# Patient Record
Sex: FEMALE | Race: BLACK OR AFRICAN AMERICAN | NOT HISPANIC OR LATINO | URBAN - METROPOLITAN AREA
[De-identification: names, ages, dates, MRNs, and addresses within clinical notes are randomized per-mention and may not be internally consistent; named-entity substitution may affect disease eponyms.]

---

## 2019-11-14 ENCOUNTER — EMERGENCY (EMERGENCY)
Facility: HOSPITAL | Age: 44
LOS: 1 days | Discharge: HOME | End: 2019-11-14
Admitting: EMERGENCY MEDICINE
Payer: COMMERCIAL

## 2019-11-14 VITALS
RESPIRATION RATE: 20 BRPM | DIASTOLIC BLOOD PRESSURE: 62 MMHG | TEMPERATURE: 98 F | HEART RATE: 68 BPM | SYSTOLIC BLOOD PRESSURE: 129 MMHG | OXYGEN SATURATION: 100 %

## 2019-11-14 LAB
ALBUMIN SERPL ELPH-MCNC: 4 G/DL — SIGNIFICANT CHANGE UP (ref 3.3–5)
ALP SERPL-CCNC: 45 U/L — SIGNIFICANT CHANGE UP (ref 40–120)
ALT FLD-CCNC: 15 U/L — SIGNIFICANT CHANGE UP (ref 4–33)
ANION GAP SERPL CALC-SCNC: 12 MMO/L — SIGNIFICANT CHANGE UP (ref 7–14)
APPEARANCE UR: CLEAR — SIGNIFICANT CHANGE UP
AST SERPL-CCNC: 13 U/L — SIGNIFICANT CHANGE UP (ref 4–32)
BACTERIA # UR AUTO: NEGATIVE — SIGNIFICANT CHANGE UP
BASOPHILS # BLD AUTO: 0.05 K/UL — SIGNIFICANT CHANGE UP (ref 0–0.2)
BASOPHILS NFR BLD AUTO: 0.2 % — SIGNIFICANT CHANGE UP (ref 0–2)
BILIRUB SERPL-MCNC: < 0.2 MG/DL — LOW (ref 0.2–1.2)
BILIRUB UR-MCNC: NEGATIVE — SIGNIFICANT CHANGE UP
BLOOD UR QL VISUAL: HIGH
BUN SERPL-MCNC: 14 MG/DL — SIGNIFICANT CHANGE UP (ref 7–23)
CALCIUM SERPL-MCNC: 8.9 MG/DL — SIGNIFICANT CHANGE UP (ref 8.4–10.5)
CHLORIDE SERPL-SCNC: 103 MMOL/L — SIGNIFICANT CHANGE UP (ref 98–107)
CO2 SERPL-SCNC: 24 MMOL/L — SIGNIFICANT CHANGE UP (ref 22–31)
COLOR SPEC: YELLOW — SIGNIFICANT CHANGE UP
CREAT SERPL-MCNC: 0.77 MG/DL — SIGNIFICANT CHANGE UP (ref 0.5–1.3)
EOSINOPHIL # BLD AUTO: 0 K/UL — SIGNIFICANT CHANGE UP (ref 0–0.5)
EOSINOPHIL NFR BLD AUTO: 0 % — SIGNIFICANT CHANGE UP (ref 0–6)
GLUCOSE SERPL-MCNC: 146 MG/DL — HIGH (ref 70–99)
GLUCOSE UR-MCNC: >1000 — HIGH
HCT VFR BLD CALC: 35.5 % — SIGNIFICANT CHANGE UP (ref 34.5–45)
HGB BLD-MCNC: 11.6 G/DL — SIGNIFICANT CHANGE UP (ref 11.5–15.5)
HYALINE CASTS # UR AUTO: NEGATIVE — SIGNIFICANT CHANGE UP
IMM GRANULOCYTES NFR BLD AUTO: 0.8 % — SIGNIFICANT CHANGE UP (ref 0–1.5)
KETONES UR-MCNC: SIGNIFICANT CHANGE UP
LEUKOCYTE ESTERASE UR-ACNC: NEGATIVE — SIGNIFICANT CHANGE UP
LYMPHOCYTES # BLD AUTO: 1.72 K/UL — SIGNIFICANT CHANGE UP (ref 1–3.3)
LYMPHOCYTES # BLD AUTO: 8 % — LOW (ref 13–44)
MCHC RBC-ENTMCNC: 31.4 PG — SIGNIFICANT CHANGE UP (ref 27–34)
MCHC RBC-ENTMCNC: 32.7 % — SIGNIFICANT CHANGE UP (ref 32–36)
MCV RBC AUTO: 96.2 FL — SIGNIFICANT CHANGE UP (ref 80–100)
MONOCYTES # BLD AUTO: 1.61 K/UL — HIGH (ref 0–0.9)
MONOCYTES NFR BLD AUTO: 7.5 % — SIGNIFICANT CHANGE UP (ref 2–14)
NEUTROPHILS # BLD AUTO: 17.98 K/UL — HIGH (ref 1.8–7.4)
NEUTROPHILS NFR BLD AUTO: 83.5 % — HIGH (ref 43–77)
NITRITE UR-MCNC: NEGATIVE — SIGNIFICANT CHANGE UP
NRBC # FLD: 0 K/UL — SIGNIFICANT CHANGE UP (ref 0–0)
PH UR: 6 — SIGNIFICANT CHANGE UP (ref 5–8)
PLATELET # BLD AUTO: 259 K/UL — SIGNIFICANT CHANGE UP (ref 150–400)
PMV BLD: 10.1 FL — SIGNIFICANT CHANGE UP (ref 7–13)
POTASSIUM SERPL-MCNC: 4 MMOL/L — SIGNIFICANT CHANGE UP (ref 3.5–5.3)
POTASSIUM SERPL-SCNC: 4 MMOL/L — SIGNIFICANT CHANGE UP (ref 3.5–5.3)
PROT SERPL-MCNC: 7.2 G/DL — SIGNIFICANT CHANGE UP (ref 6–8.3)
PROT UR-MCNC: 20 — SIGNIFICANT CHANGE UP
RBC # BLD: 3.69 M/UL — LOW (ref 3.8–5.2)
RBC # FLD: 13.3 % — SIGNIFICANT CHANGE UP (ref 10.3–14.5)
RBC CASTS # UR COMP ASSIST: >50 — HIGH (ref 0–?)
SODIUM SERPL-SCNC: 139 MMOL/L — SIGNIFICANT CHANGE UP (ref 135–145)
SP GR SPEC: > 1.04 — HIGH (ref 1–1.04)
SQUAMOUS # UR AUTO: SIGNIFICANT CHANGE UP
UROBILINOGEN FLD QL: NORMAL — SIGNIFICANT CHANGE UP
WBC # BLD: 21.54 K/UL — HIGH (ref 3.8–10.5)
WBC # FLD AUTO: 21.54 K/UL — HIGH (ref 3.8–10.5)
WBC UR QL: SIGNIFICANT CHANGE UP (ref 0–?)

## 2019-11-14 PROCEDURE — 99284 EMERGENCY DEPT VISIT MOD MDM: CPT

## 2019-11-14 PROCEDURE — 76830 TRANSVAGINAL US NON-OB: CPT | Mod: 26

## 2019-11-14 NOTE — ED PROVIDER NOTE - OBJECTIVE STATEMENT
44yo F s/p IVF placement presents to the ED c/o abd cramping and 1 episode of vaginal bleeding x today. Pt states she had a check-up on Nov 6th and was advised her HCG quant was negative at the time of the visit. Pt presents c/o severe cramping pain, similar in quality to her menstrual cramp but much more painful that began this morning, worsened around 3pm and is now improved after passing moderate clot "5 minutes ago." Pt endorsing mild cramping presently, had not had any vaginal bleeding prior to arrival and is not actively bleeding. Denies lightheadedness, cp, sob, dizziness, n/v/d, dysuria. IVF specialist based out of [place], OB/GYN in [place].

## 2019-11-14 NOTE — ED PROVIDER NOTE - PATIENT PORTAL LINK FT
You can access the FollowMyHealth Patient Portal offered by [facility] by registering at the following website: [email or web address]. By joining [employer]’s FollowMyHealth portal, you will also be able to view your health information using other applications (apps) compatible with our system.

## 2019-11-14 NOTE — ED PROVIDER NOTE - CARE PLAN
Principal Discharge DX:	Abdominal pain  Assessment and plan of treatment:	Advance activity as tolerated.  Continue all previously prescribed medications as directed unless otherwise instructed.  Follow up with your primary care physician in 48-72 hours- bring copies of your results.  Return to the ER for worsening or persistent symptoms, and/or ANY NEW OR CONCERNING SYMPTOMS. If you have issues obtaining follow up, please call: [phone] to obtain a doctor or specialist who takes your insurance in your area.  You may call [phone] to make an appointment with the internal medicine clinic.

## 2019-11-14 NOTE — ED ADULT TRIAGE NOTE - CHIEF COMPLAINT QUOTE
states" I am having severe abdominal cramping started since 3 pm and I have a failed invitro and I am waiting for period to come " c/o severe pain.  h/o fibroids

## 2019-11-14 NOTE — ED PROVIDER NOTE - NSFOLLOWUPINSTRUCTIONS_ED_ALL_ED_FT
Advance activity as tolerated.  Continue all previously prescribed medications as directed unless otherwise instructed.  Follow up with your primary care physician in 48-72 hours- bring copies of your results.  Return to the ER for worsening or persistent symptoms, and/or ANY NEW OR CONCERNING SYMPTOMS. If you have issues obtaining follow up, please call: [phone] to obtain a doctor or specialist who takes your insurance in your area.  You may call [phone] to make an appointment with the internal medicine clinic.

## 2019-11-14 NOTE — ED PROVIDER NOTE - PROGRESS NOTE DETAILS
Labs/imaging reviewed, pt w/ no evidence of IUP, no adnexal masses/no evidence of torsion. Pt w/ glucosuria; however, no anion gap or electrolyte abnormality. Pt is under care of PMD in [place], advised to f/u ASAP as she may have DM. Pt stable to be discharged from ED.

## 2019-11-14 NOTE — ED PROVIDER NOTE - PLAN OF CARE
Advance activity as tolerated.  Continue all previously prescribed medications as directed unless otherwise instructed.  Follow up with your primary care physician in 48-72 hours- bring copies of your results.  Return to the ER for worsening or persistent symptoms, and/or ANY NEW OR CONCERNING SYMPTOMS. If you have issues obtaining follow up, please call: [phone] to obtain a doctor or specialist who takes your insurance in your area.  You may call [phone] to make an appointment with the internal medicine clinic.

## 2019-11-16 LAB
BACTERIA UR CULT: SIGNIFICANT CHANGE UP
SPECIMEN SOURCE: SIGNIFICANT CHANGE UP

## 2021-03-27 ENCOUNTER — APPOINTMENT (EMERGENCY)
Dept: CT IMAGING | Facility: HOSPITAL | Age: 46
End: 2021-03-27
Payer: COMMERCIAL

## 2021-03-27 ENCOUNTER — HOSPITAL ENCOUNTER (EMERGENCY)
Facility: HOSPITAL | Age: 46
Discharge: HOME | End: 2021-03-27
Attending: EMERGENCY MEDICINE
Payer: COMMERCIAL

## 2021-03-27 VITALS
OXYGEN SATURATION: 100 % | BODY MASS INDEX: 25.61 KG/M2 | TEMPERATURE: 98.7 F | RESPIRATION RATE: 16 BRPM | HEART RATE: 69 BPM | DIASTOLIC BLOOD PRESSURE: 54 MMHG | WEIGHT: 150 LBS | HEIGHT: 64 IN | SYSTOLIC BLOOD PRESSURE: 105 MMHG

## 2021-03-27 DIAGNOSIS — R51.9 HEADACHE: Primary | ICD-10-CM

## 2021-03-27 LAB
ANION GAP SERPL CALCULATED.3IONS-SCNC: 7 MMOL/L (ref 4–13)
BASOPHILS # BLD AUTO: 0.03 THOUSANDS/ΜL (ref 0–0.1)
BASOPHILS NFR BLD AUTO: 0 % (ref 0–1)
BUN SERPL-MCNC: 15 MG/DL (ref 5–25)
CALCIUM SERPL-MCNC: 8.8 MG/DL (ref 8.3–10.1)
CHLORIDE SERPL-SCNC: 105 MMOL/L (ref 100–108)
CO2 SERPL-SCNC: 28 MMOL/L (ref 21–32)
CREAT SERPL-MCNC: 0.75 MG/DL (ref 0.6–1.3)
EOSINOPHIL # BLD AUTO: 0.05 THOUSAND/ΜL (ref 0–0.61)
EOSINOPHIL NFR BLD AUTO: 1 % (ref 0–6)
ERYTHROCYTE [DISTWIDTH] IN BLOOD BY AUTOMATED COUNT: 13.1 % (ref 11.6–15.1)
EXT PREG TEST URINE: NEGATIVE
EXT. CONTROL ED NAV: NORMAL
FLUAV RNA RESP QL NAA+PROBE: NEGATIVE
FLUBV RNA RESP QL NAA+PROBE: NEGATIVE
GFR SERPL CREATININE-BSD FRML MDRD: 113 ML/MIN/1.73SQ M
GLUCOSE SERPL-MCNC: 85 MG/DL (ref 65–140)
HCT VFR BLD AUTO: 35.4 % (ref 34.8–46.1)
HGB BLD-MCNC: 11.4 G/DL (ref 11.5–15.4)
IMM GRANULOCYTES # BLD AUTO: 0.03 THOUSAND/UL (ref 0–0.2)
IMM GRANULOCYTES NFR BLD AUTO: 0 % (ref 0–2)
LYMPHOCYTES # BLD AUTO: 1.86 THOUSANDS/ΜL (ref 0.6–4.47)
LYMPHOCYTES NFR BLD AUTO: 20 % (ref 14–44)
MCH RBC QN AUTO: 30.6 PG (ref 26.8–34.3)
MCHC RBC AUTO-ENTMCNC: 32.2 G/DL (ref 31.4–37.4)
MCV RBC AUTO: 95 FL (ref 82–98)
MONOCYTES # BLD AUTO: 0.76 THOUSAND/ΜL (ref 0.17–1.22)
MONOCYTES NFR BLD AUTO: 8 % (ref 4–12)
NEUTROPHILS # BLD AUTO: 6.43 THOUSANDS/ΜL (ref 1.85–7.62)
NEUTS SEG NFR BLD AUTO: 71 % (ref 43–75)
NRBC BLD AUTO-RTO: 0 /100 WBCS
PLATELET # BLD AUTO: 210 THOUSANDS/UL (ref 149–390)
PMV BLD AUTO: 10.5 FL (ref 8.9–12.7)
POTASSIUM SERPL-SCNC: 3.9 MMOL/L (ref 3.5–5.3)
RBC # BLD AUTO: 3.72 MILLION/UL (ref 3.81–5.12)
RSV RNA RESP QL NAA+PROBE: NEGATIVE
SARS-COV-2 RNA RESP QL NAA+PROBE: NEGATIVE
SODIUM SERPL-SCNC: 140 MMOL/L (ref 136–145)
WBC # BLD AUTO: 9.16 THOUSAND/UL (ref 4.31–10.16)

## 2021-03-27 PROCEDURE — 85025 COMPLETE CBC W/AUTO DIFF WBC: CPT | Performed by: PHYSICIAN ASSISTANT

## 2021-03-27 PROCEDURE — 0241U HB NFCT DS VIR RESP RNA 4 TRGT: CPT | Performed by: PHYSICIAN ASSISTANT

## 2021-03-27 PROCEDURE — 99284 EMERGENCY DEPT VISIT MOD MDM: CPT

## 2021-03-27 PROCEDURE — 81025 URINE PREGNANCY TEST: CPT | Performed by: PHYSICIAN ASSISTANT

## 2021-03-27 PROCEDURE — 96375 TX/PRO/DX INJ NEW DRUG ADDON: CPT

## 2021-03-27 PROCEDURE — 70450 CT HEAD/BRAIN W/O DYE: CPT

## 2021-03-27 PROCEDURE — 36415 COLL VENOUS BLD VENIPUNCTURE: CPT | Performed by: PHYSICIAN ASSISTANT

## 2021-03-27 PROCEDURE — 80048 BASIC METABOLIC PNL TOTAL CA: CPT | Performed by: PHYSICIAN ASSISTANT

## 2021-03-27 PROCEDURE — 99284 EMERGENCY DEPT VISIT MOD MDM: CPT | Performed by: PHYSICIAN ASSISTANT

## 2021-03-27 PROCEDURE — 96374 THER/PROPH/DIAG INJ IV PUSH: CPT

## 2021-03-27 RX ORDER — IBUPROFEN 400 MG/1
400 TABLET ORAL EVERY 6 HOURS PRN
Qty: 12 TABLET | Refills: 0 | Status: SHIPPED | OUTPATIENT
Start: 2021-03-27 | End: 2021-08-06

## 2021-03-27 RX ORDER — METOCLOPRAMIDE 5 MG/1
5 TABLET ORAL EVERY 6 HOURS PRN
Qty: 12 TABLET | Refills: 0 | Status: SHIPPED | OUTPATIENT
Start: 2021-03-27

## 2021-03-27 RX ORDER — DIPHENHYDRAMINE HCL 25 MG
25 TABLET ORAL EVERY 6 HOURS PRN
Qty: 12 TABLET | Refills: 0 | Status: SHIPPED | OUTPATIENT
Start: 2021-03-27

## 2021-03-27 RX ORDER — METOCLOPRAMIDE HYDROCHLORIDE 5 MG/ML
10 INJECTION INTRAMUSCULAR; INTRAVENOUS ONCE
Status: COMPLETED | OUTPATIENT
Start: 2021-03-27 | End: 2021-03-27

## 2021-03-27 RX ORDER — KETOROLAC TROMETHAMINE 30 MG/ML
30 INJECTION, SOLUTION INTRAMUSCULAR; INTRAVENOUS ONCE
Status: COMPLETED | OUTPATIENT
Start: 2021-03-27 | End: 2021-03-27

## 2021-03-27 RX ORDER — DIPHENHYDRAMINE HYDROCHLORIDE 50 MG/ML
25 INJECTION INTRAMUSCULAR; INTRAVENOUS ONCE
Status: COMPLETED | OUTPATIENT
Start: 2021-03-27 | End: 2021-03-27

## 2021-03-27 RX ADMIN — DIPHENHYDRAMINE HYDROCHLORIDE 25 MG: 50 INJECTION, SOLUTION INTRAMUSCULAR; INTRAVENOUS at 13:14

## 2021-03-27 RX ADMIN — METOCLOPRAMIDE HYDROCHLORIDE 10 MG: 5 INJECTION INTRAMUSCULAR; INTRAVENOUS at 13:17

## 2021-03-27 RX ADMIN — KETOROLAC TROMETHAMINE 30 MG: 30 INJECTION, SOLUTION INTRAMUSCULAR at 13:12

## 2021-03-27 NOTE — Clinical Note
Reginald Delgado was seen and treated in our emergency department on 3/27/2021  Diagnosis:     Xochitl Hancock  may return to work on return date  She may return on this date: 03/29/2021         If you have any questions or concerns, please don't hesitate to call        Lady Hall PA-C    ______________________________           _______________          _______________  Hospital Representative                              Date                                Time

## 2021-03-27 NOTE — ED PROVIDER NOTES
History  Chief Complaint   Patient presents with    Migraine     Pt reports for the last 4 days shes had a migraine, neck pain, N/V       39 y o  female presents to the ED with chief complaint of headache  Onset of symptoms reported as 4 days ago  Location of symptoms reported as the head  Quality is reported as throbbing pain  Severity is reported as moderate  Associated symptoms:  Positive for nausea and vomiting  Denies fevers, positive for neck pain, denies rash, denies blurred vision or loss of vision  Denies unilateral extremity paralysis, paraesthesias or weakness  Denies facial droop or slurred speech  Denies chest pain, denies syncope  Modifiers:  Patient reports she took excedrin at home with partial relief of symptoms  ContexT:  Denies recent fall, injury or trauma to the area  Patient denies that headache was maximal intensity at onset and denies that his headache is worst headache of life but states that she does not have a history of migraine headaches in the past   Positive for some sinus congestion recently as well  Denies fevers   Medical summary: Review of past visit history via EPIC demonstrates no prior visits to this ed  History provided by:  Patient   used: No    Migraine  Associated symptoms: headaches, nausea and vomiting    Associated symptoms: no abdominal pain, no chest pain, no congestion, no cough, no diarrhea, no ear pain, no fatigue, no fever, no myalgias, no rash, no rhinorrhea, no shortness of breath, no sore throat and no wheezing        None       History reviewed  No pertinent past medical history  History reviewed  No pertinent surgical history  History reviewed  No pertinent family history  I have reviewed and agree with the history as documented      E-Cigarette/Vaping    E-Cigarette Use Never User      E-Cigarette/Vaping Substances     Social History     Tobacco Use    Smoking status: Never Smoker    Smokeless tobacco: Never Used Substance Use Topics    Alcohol use: Never     Frequency: Never    Drug use: Never       Review of Systems   Constitutional: Negative for activity change, appetite change, chills, diaphoresis, fatigue, fever and unexpected weight change  HENT: Negative for congestion, dental problem, drooling, ear discharge, ear pain, facial swelling, hearing loss, mouth sores, nosebleeds, postnasal drip, rhinorrhea, sinus pressure, sinus pain, sneezing, sore throat, tinnitus, trouble swallowing and voice change  Eyes: Negative for photophobia, pain, discharge, redness, itching and visual disturbance  Respiratory: Negative for apnea, cough, choking, chest tightness, shortness of breath, wheezing and stridor  Cardiovascular: Negative for chest pain, palpitations and leg swelling  Gastrointestinal: Positive for nausea and vomiting  Negative for abdominal distention, abdominal pain, anal bleeding, blood in stool, constipation, diarrhea and rectal pain  Endocrine: Negative for cold intolerance, heat intolerance, polydipsia, polyphagia and polyuria  Genitourinary: Negative for decreased urine volume, difficulty urinating, dyspareunia, dysuria, enuresis, flank pain, frequency, hematuria, menstrual problem, pelvic pain, urgency, vaginal bleeding, vaginal discharge and vaginal pain  Musculoskeletal: Positive for neck pain  Negative for arthralgias, back pain, gait problem, joint swelling, myalgias and neck stiffness  Skin: Negative for color change, pallor, rash and wound  Allergic/Immunologic: Negative for environmental allergies, food allergies and immunocompromised state  Neurological: Positive for headaches  Negative for dizziness, tremors, seizures, syncope, facial asymmetry, speech difficulty, weakness, light-headedness and numbness  Hematological: Negative for adenopathy  Does not bruise/bleed easily     Psychiatric/Behavioral: Negative for agitation, confusion, hallucinations, self-injury and suicidal ideas  The patient is not hyperactive  All other systems reviewed and are negative  Physical Exam  Physical Exam  Vitals signs and nursing note reviewed  Constitutional:       General: She is not in acute distress  Appearance: Normal appearance  Comments: /91   Pulse 78   Temp 98 7 °F (37 1 °C) (Oral)   Resp 18   Ht 5' 4" (1 626 m)   Wt 68 kg (150 lb)   SpO2 100%   BMI 25 75 kg/m²      HENT:      Head: Normocephalic and atraumatic  Right Ear: Tympanic membrane and external ear normal       Left Ear: Tympanic membrane and external ear normal       Nose: Nose normal  No rhinorrhea  Eyes:      General: No scleral icterus  Right eye: No discharge  Left eye: No discharge  Extraocular Movements: Extraocular movements intact  Conjunctiva/sclera: Conjunctivae normal       Pupils: Pupils are equal, round, and reactive to light  Neck:      Musculoskeletal: Normal range of motion and neck supple  No neck rigidity or muscular tenderness  Cardiovascular:      Rate and Rhythm: Normal rate  Pulses: Normal pulses  Pulmonary:      Effort: Pulmonary effort is normal  No respiratory distress  Musculoskeletal: Normal range of motion  General: No deformity or signs of injury  Lymphadenopathy:      Cervical: No cervical adenopathy  Skin:     Capillary Refill: Capillary refill takes less than 2 seconds  Coloration: Skin is not jaundiced  Findings: No erythema or rash  Neurological:      General: No focal deficit present  Mental Status: She is alert and oriented to person, place, and time  Mental status is at baseline  Cranial Nerves: No cranial nerve deficit  Sensory: No sensory deficit  Motor: No weakness        Coordination: Coordination normal       Gait: Gait normal    Psychiatric:         Mood and Affect: Mood normal          Behavior: Behavior normal          Vital Signs  ED Triage Vitals   Temperature Pulse Respirations Blood Pressure SpO2   03/27/21 1146 03/27/21 1146 03/27/21 1146 03/27/21 1146 03/27/21 1146   98 7 °F (37 1 °C) 72 18 134/64 99 %      Temp Source Heart Rate Source Patient Position - Orthostatic VS BP Location FiO2 (%)   03/27/21 1146 03/27/21 1146 03/27/21 1146 03/27/21 1146 --   Oral Monitor Sitting Left arm       Pain Score       03/27/21 1312       8           Vitals:    03/27/21 1146 03/27/21 1220 03/27/21 1315   BP: 134/64 115/91 128/64   Pulse: 72 78 63   Patient Position - Orthostatic VS: Sitting Lying Lying         Visual Acuity  Visual Acuity      Most Recent Value   L Pupil Size (mm)  3   R Pupil Size (mm)  3          ED Medications  Medications   ketorolac (TORADOL) injection 30 mg (30 mg Intravenous Given 3/27/21 1312)   metoclopramide (REGLAN) injection 10 mg (10 mg Intravenous Given 3/27/21 1317)   diphenhydrAMINE (BENADRYL) injection 25 mg (25 mg Intravenous Given 3/27/21 1314)       Diagnostic Studies  Results Reviewed     Procedure Component Value Units Date/Time    COVID19, Influenza A/B, RSV PCR, [facility] [[id number]]  (Normal) Collected: 03/27/21 1237    Lab Status: Final result Specimen: Nares from Nose Updated: 03/27/21 1322     SARS-CoV-2 Negative     INFLUENZA A PCR Negative     INFLUENZA B PCR Negative     RSV PCR Negative    Narrative: This test has been authorized by FDA under an EUA (Emergency Use Assay) for use by authorized laboratories  Clinical caution and judgement should be used with the interpretation of these results with consideration of the clinical impression and other laboratory testing  Testing reported as "Positive" or "Negative" has been proven to be accurate according to standard laboratory validation requirements  All testing is performed with control materials showing appropriate reactivity at standard intervals      Basic metabolic panel [[id number]] Collected: 03/27/21 1259    Lab Status: Final result Specimen: Blood from Arm, Right Updated: 03/27/21 1320     Sodium 140 mmol/L      Potassium 3 9 mmol/L      Chloride 105 mmol/L      CO2 28 mmol/L      ANION GAP 7 mmol/L      BUN 15 mg/dL      Creatinine 0 75 mg/dL      Glucose 85 mg/dL      Calcium 8 8 mg/dL      eGFR 113 ml/min/1 73sq m     Narrative:      [place] guidelines for Chronic Kidney Disease (CKD):     Stage 1 with normal or high GFR (GFR > 90 mL/min/1 73 square meters)    Stage 2 Mild CKD (GFR = 60-89 mL/min/1 73 square meters)    Stage 3A Moderate CKD (GFR = 45-59 mL/min/1 73 square meters)    Stage 3B Moderate CKD (GFR = 30-44 mL/min/1 73 square meters)    Stage 4 Severe CKD (GFR = 15-29 mL/min/1 73 square meters)    Stage 5 End Stage CKD (GFR <15 mL/min/1 73 square meters)  Note: GFR calculation is accurate only with a steady state creatinine    CBC and differential [[id number]]  (Abnormal) Collected: 03/27/21 1259    Lab Status: Final result Specimen: Blood from Arm, Right Updated: 03/27/21 1308     WBC 9 16 Thousand/uL      RBC 3 72 Million/uL      Hemoglobin 11 4 g/dL      Hematocrit 35 4 %      MCV 95 fL      MCH 30 6 pg      MCHC 32 2 g/dL      RDW 13 1 %      MPV 10 5 fL      Platelets 460 Thousands/uL      nRBC 0 /100 WBCs      Neutrophils Relative 71 %      Immat GRANS % 0 %      Lymphocytes Relative 20 %      Monocytes Relative 8 %      Eosinophils Relative 1 %      Basophils Relative 0 %      Neutrophils Absolute 6 43 Thousands/µL      Immature Grans Absolute 0 03 Thousand/uL      Lymphocytes Absolute 1 86 Thousands/µL      Monocytes Absolute 0 76 Thousand/µL      Eosinophils Absolute 0 05 Thousand/µL      Basophils Absolute 0 03 Thousands/µL     POCT pregnancy, urine [[id number]]  (Normal) Resulted: 03/27/21 1216    Lab Status: Final result Updated: 03/27/21 1216     EXT PREG TEST UR (Ref: Negative) negative     Control valid                 CT head wo contrast   Final Result by Tim George MD (03/27 1333)      No acute intracranial abnormality  Small sphenoid sinus mucus retention cyst             Workstation performed: [id number]                    Procedures  Pre-Procedural Sedation  Performed by: Candance Breaker, PA-C  Authorized by: Candance Breaker, PA-C                ED Course  ED Course as of Mar 27 1438   Sat Mar 27, 2021   1253 Lab results reviewed  Pregnancy test negative  1427 Re-evaluation - patient feels improved after migraine cocktail given here in ed  Discussed ct scan results including incidental finding of sphenoid sinus cyst  Reviewed lab results  Patient feels improved  Discussed symptoms appear consistent with migraine headache  Discussed treatment plan including use of diphenhydramine/motrin/reglan as needed q 6 hours for headache at home  Discussed follow up with pcp and neurology in 3-5 days for recheck and further evaluation of symptoms  Reviewed reasons to return to ed  Patient verbalized understanding of diagnosis and agreement with discharge plan of care as well as understanding of reasons to return to ed  I have reasonably determine that electronically prescribing a controlled substance would be impractical for the patient to obtain the controlled substance prescribed by electronic prescription or would cause an untimely delay resulting in an adverse impact on the patient's medical condition          Patient was seen during the outbreak of the corona virus epidemic   Resources are limited due to the severity of patient illnesses associated with virus   Testing is also limited at this time   Discussed with patient at the time of this evaluation   Due to the fact that limited resources are available -treatment options are limited                                                    MDM  Number of Diagnoses or Management Options  Headache: new and requires workup  Diagnosis management comments: ddx includes but is not limited to tension headache, migraine headache, cluster headache, sinusitis, SAH, SDH, doubt temporal arteritis due to lack of unilateral temporal location of pain, consider intracranial hemorrhage, doubt meningitis due to lack of fever/nuchal rigidity  Amount and/or Complexity of Data Reviewed  Clinical lab tests: ordered and reviewed  Tests in the radiology section of CPT®: ordered and reviewed  Discussion of test results with the performing providers: yes  Review and summarize past medical records: yes  Independent visualization of images, tracings, or specimens: yes    Patient Progress  Patient progress: stable      Disposition  Final diagnoses:   Headache     Time reflects when diagnosis was documented in both MDM as applicable and the Disposition within this note     Time User Action Codes Description Comment    3/27/2021  2:30 PM Madeline Sacks Add [R51 9] Headache       ED Disposition     ED Disposition Condition Date/Time Comment    Discharge Stable Sat Mar 27, 2021  2:30  E Maddison Vidal discharge to home/self care              Follow-up Information     Follow up With Specialties Details Why Contact Info Additional [address] [facility] Emergency Department Emergency Medicine Go to  If symptoms worsen [address] Emergency Department, [address] [facility] [place], [address], MD Neurology Call in 1 week for further evaluation of headache symptoms [place] [address] [facility], [address] [facility], Nurse Practitioner Call in 2 days for further evaluation of symptoms [address] [place] [address]  [phone]             Patient's Medications   Discharge Prescriptions    DIPHENHYDRAMINE (BENADRYL) 25 MG TABLET    Take 1 tablet (25 mg total) by mouth every 6 (six) hours as needed for itching (headache/take with metoclopramide/initial rx )       Start Date: 3/27/2021 End Date: -- Order Dose: 25 mg       Quantity: 12 tablet    Refills: 0    IBUPROFEN (MOTRIN) 400 MG TABLET    Take 1 tablet (400 mg total) by mouth every 6 (six) hours as needed for moderate pain or headaches for up to 5 days       Start Date: 3/27/2021 End Date: 4/1/2021       Order Dose: 400 mg       Quantity: 12 tablet    Refills: 0    METOCLOPRAMIDE (REGLAN) 5 MG TABLET    Take 1 tablet (5 mg total) by mouth every 6 (six) hours as needed (headache/nausea/vomiting )       Start Date: 3/27/2021 End Date: --       Order Dose: 5 mg       Quantity: 12 tablet    Refills: 0     No discharge procedures on file      PDMP Review     None          ED Provider  Electronically Signed by           Radha Lemus PA-C  03/27/21 8075

## 2021-04-02 NOTE — ED PROVIDER NOTES
History  Chief Complaint   Patient presents with    Migraine     Pt reports for the last 4 days shes had a migraine, neck pain, N/V       39 y o  female presents to the ED with chief complaint of headache  Onset of symptoms reported as 4 days ago  Location of symptoms reported as the head  Quality is reported as throbbing pain  Severity is reported as moderate  Associated symptoms:  Positive for nausea and vomiting  Denies fevers, positive for neck pain, denies rash, denies blurred vision or loss of vision  Denies unilateral extremity paralysis, paraesthesias or weakness  Denies facial droop or slurred speech  Denies chest pain, denies syncope  Modifiers:  Patient reports she took excedrin at home with partial relief of symptoms  ContexT:  Denies recent fall, injury or trauma to the area  Patient denies that headache was maximal intensity at onset and denies that his headache is worst headache of life but states that she does not have a history of migraine headaches in the past   Positive for some sinus congestion recently as well  Denies fevers   Medical summary: Review of past visit history via EPIC demonstrates no prior visits to this ed  History provided by:  Patient   used: No    Migraine  Associated symptoms: headaches, nausea and vomiting    Associated symptoms: no abdominal pain, no chest pain, no congestion, no cough, no diarrhea, no ear pain, no fatigue, no fever, no myalgias, no rash, no rhinorrhea, no shortness of breath, no sore throat and no wheezing          History provided by:  Patient   used: No    Migraine  Associated symptoms: headaches and nausea    Associated symptoms: no abdominal pain, no chest pain, no congestion, no cough, no diarrhea, no ear pain, no fatigue, no fever, no myalgias, no rash, no rhinorrhea, no shortness of breath, no sore throat, no vomiting and no wheezing        None       History reviewed   No pertinent past medical history  History reviewed  No pertinent surgical history  History reviewed  No pertinent family history  I have reviewed and agree with the history as documented  E-Cigarette/Vaping    E-Cigarette Use Never User      E-Cigarette/Vaping Substances     Social History     Tobacco Use    Smoking status: Never Smoker    Smokeless tobacco: Never Used   Substance Use Topics    Alcohol use: Never     Frequency: Never    Drug use: Never       Review of Systems   Constitutional: Negative for activity change, appetite change, chills, diaphoresis, fatigue, fever and unexpected weight change  HENT: Negative for congestion, dental problem, drooling, ear discharge, ear pain, facial swelling, hearing loss, mouth sores, nosebleeds, postnasal drip, rhinorrhea, sinus pressure, sinus pain, sneezing, sore throat, tinnitus, trouble swallowing and voice change  Eyes: Negative for photophobia, pain, discharge, redness, itching and visual disturbance  Respiratory: Negative for apnea, cough, choking, chest tightness, shortness of breath, wheezing and stridor  Cardiovascular: Negative for chest pain, palpitations and leg swelling  Gastrointestinal: Positive for nausea  Negative for abdominal distention, abdominal pain, anal bleeding, blood in stool, constipation, diarrhea, rectal pain and vomiting  Endocrine: Negative for cold intolerance, heat intolerance, polydipsia, polyphagia and polyuria  Genitourinary: Negative for decreased urine volume, difficulty urinating, dyspareunia, dysuria, enuresis, flank pain, frequency, hematuria, menstrual problem, pelvic pain, urgency, vaginal bleeding, vaginal discharge and vaginal pain  Musculoskeletal: Positive for neck pain  Negative for arthralgias, back pain, gait problem, joint swelling, myalgias and neck stiffness  Skin: Negative for color change, pallor, rash and wound     Allergic/Immunologic: Negative for environmental allergies, food allergies and immunocompromised state  Neurological: Positive for headaches  Negative for dizziness, tremors, seizures, syncope, facial asymmetry, speech difficulty, weakness, light-headedness and numbness  Hematological: Negative for adenopathy  Does not bruise/bleed easily  Psychiatric/Behavioral: Negative for agitation, confusion, hallucinations, self-injury and suicidal ideas  The patient is not hyperactive  All other systems reviewed and are negative  Physical Exam  Physical Exam  Vitals signs and nursing note reviewed  Constitutional:       General: She is not in acute distress  Appearance: Normal appearance  Comments: /54 (BP Location: Left arm)   Pulse 69   Temp 98 7 °F (37 1 °C) (Oral)   Resp 16   Ht 5' 4" (1 626 m)   Wt 68 kg (150 lb)   SpO2 100%   BMI 25 75 kg/m²      HENT:      Head: Normocephalic and atraumatic  Right Ear: External ear normal       Left Ear: External ear normal       Nose: Nose normal    Eyes:      General: No scleral icterus  Right eye: No discharge  Left eye: No discharge  Conjunctiva/sclera: Conjunctivae normal    Neck:      Musculoskeletal: Normal range of motion and neck supple  Muscular tenderness present  No neck rigidity  Comments: No posterior midline cervical spinal tenderness to palpation  No bony step-offs or deformities on palpation  There is tenderness to palpation to bilateral cervical paraspinal muscles  Cardiovascular:      Rate and Rhythm: Normal rate  Pulses: Normal pulses  Pulmonary:      Effort: Pulmonary effort is normal  No respiratory distress  Abdominal:      General: Bowel sounds are normal  There is no distension  Tenderness: There is no abdominal tenderness  There is no guarding  Musculoskeletal: Normal range of motion  General: No swelling, tenderness, deformity or signs of injury  Lymphadenopathy:      Cervical: No cervical adenopathy  Skin:     General: Skin is warm  Capillary Refill: Capillary refill takes less than 2 seconds  Coloration: Skin is not jaundiced  Findings: No erythema or rash  Neurological:      General: No focal deficit present  Mental Status: She is alert and oriented to person, place, and time  Mental status is at baseline  Cranial Nerves: No cranial nerve deficit  Motor: No weakness  Psychiatric:         Mood and Affect: Mood normal          Behavior: Behavior normal          Vital Signs  ED Triage Vitals   Temperature Pulse Respirations Blood Pressure SpO2   03/27/21 1146 03/27/21 1146 03/27/21 1146 03/27/21 1146 03/27/21 1146   98 7 °F (37 1 °C) 72 18 134/64 99 %      Temp Source Heart Rate Source Patient Position - Orthostatic VS BP Location FiO2 (%)   03/27/21 1146 03/27/21 1146 03/27/21 1146 03/27/21 1146 --   Oral Monitor Sitting Left arm       Pain Score       03/27/21 1312       8           Vitals:    03/27/21 1146 03/27/21 1220 03/27/21 1315 03/27/21 1400   BP: 134/64 115/91 128/64 105/54   Pulse: 72 78 63 69   Patient Position - Orthostatic VS: Sitting Lying Lying Lying         Visual Acuity  Visual Acuity      Most Recent Value   L Pupil Size (mm)  3   R Pupil Size (mm)  3          ED Medications  Medications   ketorolac (TORADOL) injection 30 mg (30 mg Intravenous Given 3/27/21 1312)   metoclopramide (REGLAN) injection 10 mg (10 mg Intravenous Given 3/27/21 1317)   diphenhydrAMINE (BENADRYL) injection 25 mg (25 mg Intravenous Given 3/27/21 1314)       Diagnostic Studies  Results Reviewed     Procedure Component Value Units Date/Time    COVID19, Influenza A/B, RSV PCR, [facility] [[id number]]  (Normal) Collected: 03/27/21 1237    Lab Status: Final result Specimen: Nares from Nose Updated: 03/27/21 1322     SARS-CoV-2 Negative     INFLUENZA A PCR Negative     INFLUENZA B PCR Negative     RSV PCR Negative    Narrative:        This test has been authorized by FDA under an EUA (Emergency Use Assay) for use by authorized laboratories  Clinical caution and judgement should be used with the interpretation of these results with consideration of the clinical impression and other laboratory testing  Testing reported as "Positive" or "Negative" has been proven to be accurate according to standard laboratory validation requirements  All testing is performed with control materials showing appropriate reactivity at standard intervals      Basic metabolic panel [[id number]] Collected: 03/27/21 1259    Lab Status: Final result Specimen: Blood from Arm, Right Updated: 03/27/21 1320     Sodium 140 mmol/L      Potassium 3 9 mmol/L      Chloride 105 mmol/L      CO2 28 mmol/L      ANION GAP 7 mmol/L      BUN 15 mg/dL      Creatinine 0 75 mg/dL      Glucose 85 mg/dL      Calcium 8 8 mg/dL      eGFR 113 ml/min/1 73sq m     Narrative:      [place] guidelines for Chronic Kidney Disease (CKD):     Stage 1 with normal or high GFR (GFR > 90 mL/min/1 73 square meters)    Stage 2 Mild CKD (GFR = 60-89 mL/min/1 73 square meters)    Stage 3A Moderate CKD (GFR = 45-59 mL/min/1 73 square meters)    Stage 3B Moderate CKD (GFR = 30-44 mL/min/1 73 square meters)    Stage 4 Severe CKD (GFR = 15-29 mL/min/1 73 square meters)    Stage 5 End Stage CKD (GFR <15 mL/min/1 73 square meters)  Note: GFR calculation is accurate only with a steady state creatinine    CBC and differential [[id number]]  (Abnormal) Collected: 03/27/21 1259    Lab Status: Final result Specimen: Blood from Arm, Right Updated: 03/27/21 1308     WBC 9 16 Thousand/uL      RBC 3 72 Million/uL      Hemoglobin 11 4 g/dL      Hematocrit 35 4 %      MCV 95 fL      MCH 30 6 pg      MCHC 32 2 g/dL      RDW 13 1 %      MPV 10 5 fL      Platelets 952 Thousands/uL      nRBC 0 /100 WBCs      Neutrophils Relative 71 %      Immat GRANS % 0 %      Lymphocytes Relative 20 %      Monocytes Relative 8 %      Eosinophils Relative 1 %      Basophils Relative 0 %      Neutrophils Absolute 6 43 Thousands/µL      Immature Grans Absolute 0 03 Thousand/uL      Lymphocytes Absolute 1 86 Thousands/µL      Monocytes Absolute 0 76 Thousand/µL      Eosinophils Absolute 0 05 Thousand/µL      Basophils Absolute 0 03 Thousands/µL     POCT pregnancy, urine [[id number]]  (Normal) Resulted: 03/27/21 1216    Lab Status: Final result Updated: 03/27/21 1216     EXT PREG TEST UR (Ref: Negative) negative     Control valid                 CT head wo contrast   Final Result by Susan Denis MD (03/27 1333)      No acute intracranial abnormality  Small sphenoid sinus mucus retention cyst             Workstation performed: [id number]                    Procedures  Procedures         ED Course  ED Course as of Apr 02 1400   Sat Mar 27, 2021   1253 Lab results reviewed  Pregnancy test negative  1427 Re-evaluation - patient feels improved after migraine cocktail given here in ed  Discussed ct scan results including incidental finding of sphenoid sinus cyst  Reviewed lab results  Patient feels improved  Discussed symptoms appear consistent with migraine headache  Discussed treatment plan including use of diphenhydramine/motrin/reglan as needed q 6 hours for headache at home  Discussed follow up with pcp and neurology in 3-5 days for recheck and further evaluation of symptoms  Reviewed reasons to return to ed  Patient verbalized understanding of diagnosis and agreement with discharge plan of care as well as understanding of reasons to return to ed  I have reasonably determine that electronically prescribing a controlled substance would be impractical for the patient to obtain the controlled substance prescribed by electronic prescription or would cause an untimely delay resulting in an adverse impact on the patient's medical condition          Patient was seen during the outbreak of the corona virus epidemic   Resources are limited due to the severity of patient illnesses associated with virus   Testing is also limited at this time   Discussed with patient at the time of this evaluation   Due to the fact that limited resources are available -treatment options are limited  MDM  Number of Diagnoses or Management Options  Headache: new and requires workup  Diagnosis management comments: ddx includes but is not limited to tension headache, migraine headache, cluster headache, sinusitis, SAH, SDH, doubt temporal arteritis due to lack of unilateral temporal location of pain, doubt intracranial hemorrhage, doubt meningitis due to lack of fever/nuchal rigidity  Lab results reviewed  CBC demonstrates normal white blood cell count of 9 1, hemoglobin 11 4 is mildly low, hematocrit 35 4 normal   Basic metabolic panel demonstrates a BUN of 15 creatinine 0 74 which are normal   COVID test was negative  Pregnancy test was negative  CT scan of the head images independently visualized interpreted by me  Radiology report reviewed:FINDINGS:     PARENCHYMA:  No intracranial mass, mass effect or midline shift  No CT signs of acute infarction   No acute parenchymal hemorrhage  VENTRICLES AND EXTRA-AXIAL SPACES:  Normal for the patient's age  VISUALIZED ORBITS AND PARANASAL SINUSES:  Sphenoid sinus mucous retention cyst      CALVARIUM AND EXTRACRANIAL SOFT TISSUES:  Normal      Incidentally noted sphenoid sinus mucosal retention cyst findings on CT scan were discussed with the patient at bedside    Discussed outpatient follow-up for further evaluation       Amount and/or Complexity of Data Reviewed  Clinical lab tests: ordered and reviewed  Tests in the radiology section of CPT®: ordered and reviewed  Discussion of test results with the performing providers: yes  Obtain history from someone other than the patient: yes (Sig other)  Review and summarize past medical records: yes  Independent visualization of images, tracings, or specimens: yes    Risk of Complications, Morbidity, and/or Mortality  General comments: ED course:  68-year-old female presents the emergency department chief complaint of headache  Four days ago  No fevers  No nuchal rigidity or meningismal symptoms  CT scan shows no acute intracranial hemorrhage  Lab evaluation largely unremarkable  COVID test was negative  Discussed symptoms appear most consistent with migraine headache  Patient received migraine cocktail in the emergency department with good resolution of her symptoms  Patient feels improved  Discussed treatment plan including rest, use of components of migraine cocktail home for treatment of symptoms  Discussed follow-up with primary care physician and Neurology in 3-5 days for further evaluation treatment of headaches  Discussed follow-up with primary care physician or ENT for further evaluation of sphenoid sinus mucous retention cyst   Patient feels improved after treatment is stable for discharge  Reviewed reasons to return to ed  Patient verbalized understanding of diagnosis and agreement with discharge plan of care as well as understanding of reasons to return to ed  I have reasonably determine that electronically prescribing a controlled substance would be impractical for the patient to obtain the controlled substance prescribed by electronic prescription or would cause an untimely delay resulting in an adverse impact on the patient's medical condition        Patient was seen during the outbreak of the corona virus epidemic   Resources are limited due to the severity of patient illnesses associated with virus   Testing is also limited at this time   Discussed with patient at the time of this evaluation   Due to the fact that limited resources are available -treatment options are limited          Patient Progress  Patient progress: improved      Disposition  Final diagnoses:   Headache     Time reflects when diagnosis was documented in both MDM as applicable and the Disposition within this note     Time User Action Codes Description Comment    3/27/2021  2:30 PM Gary Ortega [R51 [id number]] Headache       ED Disposition     ED Disposition Condition Date/Time Comment    Discharge Stable Sat Mar 27, 2021  2:30  E Maddison Vidal discharge to home/self care  Follow-up Information     Follow up With Specialties Details Why Contact Info Additional [address] [facility] Emergency Medicine Go to  If symptoms worsen [address] [facility] [id number] [facility], [address] [place], [place], [address], [place] Neurology Call in 1 week for further evaluation of headache symptoms [place] [id number] [place] [address], [address] [place] [address], Nurse Practitioner Call in 2 days for further evaluation of symptoms [address] [facility] [address]  [phone]             Discharge Medication List as of 3/27/2021  2:33 PM      START taking these medications    Details   diphenhydrAMINE (BENADRYL) 25 mg tablet Take 1 tablet (25 mg total) by mouth every 6 (six) hours as needed for itching (headache/take with metoclopramide/initial rx ), Starting Sat 3/27/2021, Normal      ibuprofen (MOTRIN) 400 mg tablet Take 1 tablet (400 mg total) by mouth every 6 (six) hours as needed for moderate pain or headaches for up to 5 days, Starting Sat 3/27/2021, Until Thu 4/1/2021, Normal      metoclopramide (REGLAN) 5 mg tablet Take 1 tablet (5 mg total) by mouth every 6 (six) hours as needed (headache/nausea/vomiting ), Starting Sat 3/27/2021, Normal           No discharge procedures on file      PDMP Review     None          ED Provider  Electronically Signed by           Maeve Amin PA-C  04/02/21 1400

## 2021-04-08 ENCOUNTER — OFFICE VISIT (OUTPATIENT)
Dept: FAMILY MEDICINE CLINIC | Facility: CLINIC | Age: 46
End: 2021-04-08
Payer: COMMERCIAL

## 2021-04-08 VITALS
SYSTOLIC BLOOD PRESSURE: 108 MMHG | RESPIRATION RATE: 18 BRPM | DIASTOLIC BLOOD PRESSURE: 66 MMHG | HEIGHT: 64 IN | BODY MASS INDEX: 26.98 KG/M2 | HEART RATE: 77 BPM | TEMPERATURE: 98.1 F | WEIGHT: 158 LBS | OXYGEN SATURATION: 99 %

## 2021-04-08 DIAGNOSIS — Z12.31 ENCOUNTER FOR SCREENING MAMMOGRAM FOR BREAST CANCER: ICD-10-CM

## 2021-04-08 DIAGNOSIS — H69.92 EUSTACHIAN TUBE DISORDER, LEFT: ICD-10-CM

## 2021-04-08 DIAGNOSIS — G43.009 MIGRAINE WITHOUT AURA AND WITHOUT STATUS MIGRAINOSUS, NOT INTRACTABLE: Primary | ICD-10-CM

## 2021-04-08 PROCEDURE — 3725F SCREEN DEPRESSION PERFORMED: CPT | Performed by: NURSE PRACTITIONER

## 2021-04-08 PROCEDURE — 99204 OFFICE O/P NEW MOD 45 MIN: CPT | Performed by: NURSE PRACTITIONER

## 2021-04-08 RX ORDER — SUMATRIPTAN 50 MG/1
TABLET, FILM COATED ORAL
Qty: 9 TABLET | Refills: 0 | Status: SHIPPED | OUTPATIENT
Start: 2021-04-08

## 2021-04-08 RX ORDER — FLUTICASONE PROPIONATE 50 MCG
1 SPRAY, SUSPENSION (ML) NASAL DAILY
Qty: 16 G | Refills: 2 | Status: SHIPPED | OUTPATIENT
Start: 2021-04-08

## 2021-04-08 NOTE — ASSESSMENT & PLAN NOTE
To begin Flonase 1 spray every 12 hours  Follow-up if no improvement in 1 week or sooner if symptoms worsen

## 2021-04-08 NOTE — PROGRESS NOTES
Assessment/Plan:    Migraine without aura and without status migrainosus, not intractable  To begin sumatriptan as needed for abortive therapy  Also discussed patient starting a magnesium and B2 supplement  To begin migraine log to help identify triggers  Counseled on methods to prevent migraines by ensuring patient is staying well hydrated, obtaining enough sleep, eating regularly, and avoiding foods containing tyramine or msg  To keep upcoming appointment with the neurologist next week  Eustachian tube disorder, left  To begin Flonase 1 spray every 12 hours  Follow-up if no improvement in 1 week or sooner if symptoms worsen  Diagnoses and all orders for this visit:    Migraine without aura and without status migrainosus, not intractable  -     fluticasone (FLONASE) 50 mcg/act nasal spray; 1 spray into each nostril daily  -     SUMAtriptan (IMITREX) 50 mg tablet; Take 1 tab at onset of migraines, may repeat in 2 hours if needed for 1 dose  Encounter for screening mammogram for breast cancer  -     Mammo screening bilateral w 3d & cad; Future    Eustachian tube disorder, left          Subjective:      Patient ID: Kaylah Sunshine is a 39 y o  female  Jh Valadez presents for an initial visit  She has not been seen by PCP in the last several years since moving to this area  She developed intermittent headaches about 3 weeks ago  Her headache started on 03/14/2021  After about a week of her headaches, Melsisa was seen in the ER  Her headaches were located throughout her entire head and described as a throbbing pain  She was also experiencing nausea, vomiting, and dizziness  She was given Benadryl, ibuprofen, and Reglan on discharge  A CT of her head was negative other than an incidental finding of a sphenoid cyst   Melissa has an upcoming appointment with the neurologist next week    Her headache has resolved but she does continue to have difficulty focusing and blurred vision when looking in the distance  Her last eye exam was in December, she does were glasses  Denies fever, chills, weakness in extremities, paresthesias, or history of migraines  The following portions of the patient's history were reviewed and updated as appropriate:   She   Patient Active Problem List    Diagnosis Date Noted    Migraine without aura and without status migrainosus, not intractable 04/08/2021    Eustachian tube disorder, left 04/08/2021     Current Outpatient Medications   Medication Sig Dispense Refill    diphenhydrAMINE (BENADRYL) 25 mg tablet Take 1 tablet (25 mg total) by mouth every 6 (six) hours as needed for itching (headache/take with metoclopramide/initial rx ) 12 tablet 0    metoclopramide (REGLAN) 5 mg tablet Take 1 tablet (5 mg total) by mouth every 6 (six) hours as needed (headache/nausea/vomiting ) 12 tablet 0    fluticasone (FLONASE) 50 mcg/act nasal spray 1 spray into each nostril daily 16 g 2    ibuprofen (MOTRIN) 400 mg tablet Take 1 tablet (400 mg total) by mouth every 6 (six) hours as needed for moderate pain or headaches for up to 5 days 12 tablet 0    SUMAtriptan (IMITREX) 50 mg tablet Take 1 tab at onset of migraines, may repeat in 2 hours if needed for 1 dose  9 tablet 0     No current facility-administered medications for this visit  She has No Known Allergies       Review of Systems   Constitutional: Negative  HENT: Positive for congestion  Decrease in hearing in left ear   Eyes: Positive for visual disturbance  Respiratory: Negative  Cardiovascular: Negative  Musculoskeletal: Negative  Skin: Negative  Allergic/Immunologic: Positive for environmental allergies  Neurological: Positive for dizziness and headaches  Negative for seizures, speech difficulty, weakness and numbness  Psychiatric/Behavioral: Negative            /66   Pulse 77   Temp 98 1 °F (36 7 °C) (Tympanic)   Resp 18   Ht 5' 4" (1 626 m)   Wt 71 7 kg (158 lb) LMP 04/03/2021 (Exact Date)   SpO2 99%   BMI 27 12 kg/m²     Objective:     Physical Exam  Vitals signs and nursing note reviewed  Constitutional:       General: She is not in acute distress  Appearance: Normal appearance  She is well-developed and normal weight  She is not ill-appearing, toxic-appearing or diaphoretic  HENT:      Head: Normocephalic and atraumatic  Right Ear: Hearing, tympanic membrane, ear canal and external ear normal       Left Ear: Hearing, ear canal and external ear normal       Ears:      Comments: Presence of fluid behind left tm, no erythema      Nose: Nose normal       Right Turbinates: Enlarged and pale  Left Turbinates: Enlarged and pale  Mouth/Throat:      Mouth: Mucous membranes are moist       Pharynx: Oropharynx is clear  Uvula midline  Eyes:      General: Lids are normal       Conjunctiva/sclera: Conjunctivae normal       Pupils: Pupils are equal, round, and reactive to light  Neck:      Musculoskeletal: Normal range of motion and neck supple  Thyroid: No thyromegaly  Cardiovascular:      Rate and Rhythm: Normal rate and regular rhythm  Heart sounds: Normal heart sounds  No murmur  Pulmonary:      Effort: Pulmonary effort is normal  No respiratory distress  Breath sounds: Normal breath sounds  No wheezing or rales  Chest:      Chest wall: No tenderness  Abdominal:      General: Bowel sounds are normal  There is no distension  Palpations: Abdomen is soft  There is no mass  Tenderness: There is no abdominal tenderness  There is no guarding or rebound  Musculoskeletal: Normal range of motion  General: No tenderness or deformity  Lymphadenopathy:      Cervical: No cervical adenopathy  Skin:     General: Skin is warm and dry  Coloration: Skin is not pale  Findings: No erythema or rash  Neurological:      General: No focal deficit present        Mental Status: She is alert and oriented to person, place, and time  Cranial Nerves: No cranial nerve deficit  Sensory: No sensory deficit  Motor: No weakness  Gait: Gait normal    Psychiatric:         Mood and Affect: Mood normal          Behavior: Behavior normal          Thought Content: Thought content normal          Judgment: Judgment normal              BMI Counseling: Body mass index is 27 12 kg/m²  The BMI is above normal  Nutrition recommendations include reducing portion sizes, decreasing overall calorie intake, 3-5 servings of fruits/vegetables daily, reducing fast food intake, consuming healthier snacks, decreasing soda and/or juice intake, moderation in carbohydrate intake and increasing intake of lean protein  Exercise recommendations include exercising 3-5 times per week

## 2021-04-08 NOTE — PATIENT INSTRUCTIONS

## 2021-04-08 NOTE — ASSESSMENT & PLAN NOTE
To begin sumatriptan as needed for abortive therapy  Also discussed patient starting a magnesium and B2 supplement  To begin migraine log to help identify triggers  Counseled on methods to prevent migraines by ensuring patient is staying well hydrated, obtaining enough sleep, eating regularly, and avoiding foods containing tyramine or msg  To keep upcoming appointment with the neurologist next week

## 2021-04-19 ENCOUNTER — APPOINTMENT (OUTPATIENT)
Dept: LAB | Facility: CLINIC | Age: 46
End: 2021-04-19
Payer: COMMERCIAL

## 2021-04-19 ENCOUNTER — CONSULT (OUTPATIENT)
Dept: NEUROLOGY | Facility: CLINIC | Age: 46
End: 2021-04-19
Payer: COMMERCIAL

## 2021-04-19 VITALS
DIASTOLIC BLOOD PRESSURE: 60 MMHG | WEIGHT: 153 LBS | SYSTOLIC BLOOD PRESSURE: 104 MMHG | BODY MASS INDEX: 25.49 KG/M2 | HEIGHT: 65 IN | HEART RATE: 64 BPM

## 2021-04-19 DIAGNOSIS — H53.2 DIPLOPIA: Primary | ICD-10-CM

## 2021-04-19 DIAGNOSIS — G44.52 NEW PERSISTENT DAILY HEADACHE: ICD-10-CM

## 2021-04-19 DIAGNOSIS — H53.2 DIPLOPIA: ICD-10-CM

## 2021-04-19 LAB
ERYTHROCYTE [SEDIMENTATION RATE] IN BLOOD: 29 MM/HOUR (ref 0–19)
TSH SERPL DL<=0.05 MIU/L-ACNC: 0.8 UIU/ML (ref 0.36–3.74)

## 2021-04-19 PROCEDURE — 36415 COLL VENOUS BLD VENIPUNCTURE: CPT

## 2021-04-19 PROCEDURE — 3008F BODY MASS INDEX DOCD: CPT | Performed by: PSYCHIATRY & NEUROLOGY

## 2021-04-19 PROCEDURE — 86618 LYME DISEASE ANTIBODY: CPT

## 2021-04-19 PROCEDURE — 85652 RBC SED RATE AUTOMATED: CPT

## 2021-04-19 PROCEDURE — 84443 ASSAY THYROID STIM HORMONE: CPT

## 2021-04-19 PROCEDURE — 1036F TOBACCO NON-USER: CPT | Performed by: PSYCHIATRY & NEUROLOGY

## 2021-04-19 PROCEDURE — 99204 OFFICE O/P NEW MOD 45 MIN: CPT | Performed by: PSYCHIATRY & NEUROLOGY

## 2021-04-19 PROCEDURE — 86038 ANTINUCLEAR ANTIBODIES: CPT

## 2021-04-19 PROCEDURE — 84238 ASSAY NONENDOCRINE RECEPTOR: CPT

## 2021-04-19 PROCEDURE — 83519 RIA NONANTIBODY: CPT

## 2021-04-19 NOTE — LETTER
April 19, 2021     Дмитрий Kennedy, [address] [facility] [id number] [place] [address]  [address] [place] [address]    Patient: Jey Baez   YOB: 1975   Date of Visit: 4/19/2021       Dear Dr Renny Gomez: Thank you for referring Ming James to me for evaluation  Below are my notes for this consultation  If you have questions, please do not hesitate to call me  I look forward to following your patient along with you  Sincerely,        Flakito Adam MD        CC: No Recipients  Flakito Adam MD  4/19/2021 10:34 AM  Sign when Signing Visit  Jey Baez is a 39 y o  female  Presents with complaint of headache    Assessment:  1  Diplopia    2  New persistent daily headache        Plan:   MRI brain and orbits with without contrast   Blood work  Follow-up 4 weeks    Discussion:  Tanisha Miller has headaches with vascular features in a nonfocal neurologic exam   She reports a persistent daily headache since March 24th with a history of vascular type headaches that would occur associated with her ovulation  Interestingly she has developed horizontal diplopia which localizes to the left 6 nerve which began about 10 days after the onset of her headaches  Cannot exclude migraine however given the above have recommended MRI brain and orbits as well as blood work to include sedimentation rate Lyme titer in acetylcholine receptor antibody titers  If she notes new or worsening neurologic symptoms she will notify me and I will see her back in follow-up in 1 month  If results are negative and she continues to have frequent headaches consider a prophylactic approach      Subjective:    HPI  Tanisha Miller is a right-handed woman who presents with the above complaints  She states that prior to March 24th she would get a pressure type headache localized to the vertex on the mornings that she would ovulating  She states that they would go away with over-the-counter medication and would not recur    She states that starting March 24th she developed a headache that was initially was a generalized headache but now seems to localize more to the right frontal region  She states it is a pressure type pain with minimal throbbing  She states she has noted some photophobia but not so much sonophobia  She states she has quite a bit of nausea and vomiting initially but this is not been a significant feature recently  She states that since the 24th she has had a headache that is occurred every day  She states the only time she does not have a headache is when she takes Advil Sinus  She states she has been taking this about 3 or 4 times a day  She states initially she took Excedrin and this also helped  She was given a prescription for Imitrex 50 mg and states that she felt quite sick to her stomach with the medication and just did not feel right so she has not taken it again  She states about 10 days after the onset of her headache she noted some changes in her vision  She states that she noticed especially when she was driving that she was seeing double  She found that if she closed 1 eye the double vision went away  She states the double vision was horizontal in nature and seem to be worse at a distance with no double vision with close-up vision  She also found that looking to the left seem to amplify the diplopia  She states that over time the double vision is not as prominent but has not gone away completely  She denies any history of similar symptoms  She has not noticed any vision disturbance or other localizing symptoms associated with the onset of her headaches  She reports that her mother has a history of migraine headaches  She did have a CT scan of her head performed on March 27th when she was in the emergency room and this was remarkable for small sphenoid sinus mucous retention cyst otherwise no intracranial abnormalities were noted        Past Medical History:   Diagnosis Date    Pneumothorax on right 1       Family History:  Family History   Problem Relation Age of Onset    Stomach cancer Brother     Migraines Mother     Cirrhosis Father     No Known Problems Sister     No Known Problems Sister     No Known Problems Brother        Past Surgical History:  Past Surgical History:   Procedure Laterality Date    DILATION AND CURETTAGE OF UTERUS      MYOMECTOMY         Social History:   reports that she has never smoked  She has never used smokeless tobacco  She reports that she does not drink alcohol or use drugs  Allergies:  Patient has no known allergies  Current Outpatient Medications:     fluticasone (FLONASE) 50 mcg/act nasal spray, 1 spray into each nostril daily, Disp: 16 g, Rfl: 2    Phenylephrine-Ibuprofen (ADVIL SINUS CONGESTION & PAIN PO), Take by mouth daily, Disp: , Rfl:     diphenhydrAMINE (BENADRYL) 25 mg tablet, Take 1 tablet (25 mg total) by mouth every 6 (six) hours as needed for itching (headache/take with metoclopramide/initial rx ) (Patient not taking: Reported on 4/19/2021), Disp: 12 tablet, Rfl: 0    ibuprofen (MOTRIN) 400 mg tablet, Take 1 tablet (400 mg total) by mouth every 6 (six) hours as needed for moderate pain or headaches for up to 5 days, Disp: 12 tablet, Rfl: 0    metoclopramide (REGLAN) 5 mg tablet, Take 1 tablet (5 mg total) by mouth every 6 (six) hours as needed (headache/nausea/vomiting ) (Patient not taking: Reported on 4/19/2021), Disp: 12 tablet, Rfl: 0    SUMAtriptan (IMITREX) 50 mg tablet, Take 1 tab at onset of migraines, may repeat in 2 hours if needed for 1 dose  (Patient not taking: Reported on 4/19/2021), Disp: 9 tablet, Rfl: 0      I have reviewed the past medical, social and family history, current medications, allergies, vitals, review of systems and updated this information as appropriate today     Objective:    Vitals:  Blood pressure 104/60, pulse 64, height 5' 5" (1 651 m), weight 69 4 kg (153 lb), last menstrual period 04/03/2021      Physical Exam    Neurological Exam    GENERAL:  Cooperative in no acute distress  Well-developed and well-nourished    HEAD and NECK   Head is atraumatic normocephalic with no lesions or masses  Neck is supple with full range of motion    CARDIOVASCULAR  Carotid Arteries-no carotid bruits  NEUROLOGIC:  Mental Status-the patient is awake alert and oriented without aphasia or apraxia  Cranial Nerves: Visual fields are full to confrontation  Discs are flat  Extraocular movements are full without nystagmus  Pupils are 2-1/2 mm and reactive  Face is symmetrical to light touch  Movements of facial expression move symmetrically  Hearing is normal to finger rub bilaterally  Soft palate lifts symmetrically  Shoulder shrug is symmetrical  Tongue is midline without atrophy  Motor: No drift is noted on arm extension  Strength is full in the upper and lower extremities with normal bulk and tone  Sensory: Intact to temperature and vibratory sensation in the upper and lower extremities bilaterally  Cortical function is intact  Coordination: Finger to nose testing is performed accurately  Romberg is negative  Gait reveals a normal base with symmetrical arm swing  Tandem walk is normal   Reflexes:   2/4 in the biceps, triceps, brachioradialis, knee jerk and ankle jerk regions  Toes are downgoing            ROS:    Review of Systems   Constitutional: Positive for fatigue  Negative for appetite change and fever  HENT: Positive for congestion  Negative for hearing loss, tinnitus, trouble swallowing and voice change  Eyes: Positive for photophobia, pain and visual disturbance (double vision)  Respiratory: Negative  Negative for shortness of breath  Cardiovascular: Negative  Negative for palpitations  Gastrointestinal: Negative  Negative for nausea and vomiting  Endocrine: Negative  Negative for cold intolerance  Genitourinary: Negative  Negative for dysuria, frequency and urgency  Musculoskeletal: Negative  Negative for back pain, gait problem, myalgias and neck pain  Skin: Negative  Negative for rash  Neurological: Positive for headaches  Negative for dizziness, tremors, seizures, syncope, facial asymmetry, speech difficulty, weakness, light-headedness and numbness  Hematological: Negative  Does not bruise/bleed easily  Psychiatric/Behavioral: Negative  Negative for confusion, decreased concentration, hallucinations and sleep disturbance

## 2021-04-19 NOTE — PROGRESS NOTES
Geno George is a 39 y o  female  Presents with complaint of headache    Assessment:  1  Diplopia    2  New persistent daily headache        Plan:   MRI brain and orbits with without contrast   Blood work  Follow-up 4 weeks    Discussion:  Roberto Cid has headaches with vascular features in a nonfocal neurologic exam   She reports a persistent daily headache since March 24th with a history of vascular type headaches that would occur associated with her ovulation  Interestingly she has developed horizontal diplopia which localizes to the left 6 nerve which began about 10 days after the onset of her headaches  Cannot exclude migraine however given the above have recommended MRI brain and orbits as well as blood work to include sedimentation rate Lyme titer in acetylcholine receptor antibody titers  If she notes new or worsening neurologic symptoms she will notify me and I will see her back in follow-up in 1 month  If results are negative and she continues to have frequent headaches consider a prophylactic approach      Subjective:    HPI  Roberto Cid is a right-handed woman who presents with the above complaints  She states that prior to March 24th she would get a pressure type headache localized to the vertex on the mornings that she would ovulating  She states that they would go away with over-the-counter medication and would not recur  She states that starting March 24th she developed a headache that was initially was a generalized headache but now seems to localize more to the right frontal region  She states it is a pressure type pain with minimal throbbing  She states she has noted some photophobia but not so much sonophobia  She states she has quite a bit of nausea and vomiting initially but this is not been a significant feature recently  She states that since the 24th she has had a headache that is occurred every day    She states the only time she does not have a headache is when she takes Advil Sinus   She states she has been taking this about 3 or 4 times a day  She states initially she took Excedrin and this also helped  She was given a prescription for Imitrex 50 mg and states that she felt quite sick to her stomach with the medication and just did not feel right so she has not taken it again  She states about 10 days after the onset of her headache she noted some changes in her vision  She states that she noticed especially when she was driving that she was seeing double  She found that if she closed 1 eye the double vision went away  She states the double vision was horizontal in nature and seem to be worse at a distance with no double vision with close-up vision  She also found that looking to the left seem to amplify the diplopia  She states that over time the double vision is not as prominent but has not gone away completely  She denies any history of similar symptoms  She has not noticed any vision disturbance or other localizing symptoms associated with the onset of her headaches  She reports that her mother has a history of migraine headaches  She did have a CT scan of her head performed on March 27th when she was in the emergency room and this was remarkable for small sphenoid sinus mucous retention cyst otherwise no intracranial abnormalities were noted  Past Medical History:   Diagnosis Date    Pneumothorax on right 1       Family History:  Family History   Problem Relation Age of Onset    Stomach cancer Brother     Migraines Mother     Cirrhosis Father     No Known Problems Sister     No Known Problems Sister     No Known Problems Brother        Past Surgical History:  Past Surgical History:   Procedure Laterality Date    DILATION AND CURETTAGE OF UTERUS      MYOMECTOMY         Social History:   reports that she has never smoked  She has never used smokeless tobacco  She reports that she does not drink alcohol or use drugs      Allergies:  Patient has no known allergies  Current Outpatient Medications:     fluticasone (FLONASE) 50 mcg/act nasal spray, 1 spray into each nostril daily, Disp: 16 g, Rfl: 2    Phenylephrine-Ibuprofen (ADVIL SINUS CONGESTION & PAIN PO), Take by mouth daily, Disp: , Rfl:     diphenhydrAMINE (BENADRYL) 25 mg tablet, Take 1 tablet (25 mg total) by mouth every 6 (six) hours as needed for itching (headache/take with metoclopramide/initial rx ) (Patient not taking: Reported on 4/19/2021), Disp: 12 tablet, Rfl: 0    ibuprofen (MOTRIN) 400 mg tablet, Take 1 tablet (400 mg total) by mouth every 6 (six) hours as needed for moderate pain or headaches for up to 5 days, Disp: 12 tablet, Rfl: 0    metoclopramide (REGLAN) 5 mg tablet, Take 1 tablet (5 mg total) by mouth every 6 (six) hours as needed (headache/nausea/vomiting ) (Patient not taking: Reported on 4/19/2021), Disp: 12 tablet, Rfl: 0    SUMAtriptan (IMITREX) 50 mg tablet, Take 1 tab at onset of migraines, may repeat in 2 hours if needed for 1 dose  (Patient not taking: Reported on 4/19/2021), Disp: 9 tablet, Rfl: 0      I have reviewed the past medical, social and family history, current medications, allergies, vitals, review of systems and updated this information as appropriate today     Objective:    Vitals:  Blood pressure 104/60, pulse 64, height 5' 5" (1 651 m), weight 69 4 kg (153 lb), last menstrual period 04/03/2021  Physical Exam    Neurological Exam    GENERAL:  Cooperative in no acute distress  Well-developed and well-nourished    HEAD and NECK   Head is atraumatic normocephalic with no lesions or masses  Neck is supple with full range of motion    CARDIOVASCULAR  Carotid Arteries-no carotid bruits  NEUROLOGIC:  Mental Status-the patient is awake alert and oriented without aphasia or apraxia  Cranial Nerves: Visual fields are full to confrontation  Discs are flat  Extraocular movements are full without nystagmus  Pupils are 2-1/2 mm and reactive   Face is symmetrical to light touch  Movements of facial expression move symmetrically  Hearing is normal to finger rub bilaterally  Soft palate lifts symmetrically  Shoulder shrug is symmetrical  Tongue is midline without atrophy  Motor: No drift is noted on arm extension  Strength is full in the upper and lower extremities with normal bulk and tone  Sensory: Intact to temperature and vibratory sensation in the upper and lower extremities bilaterally  Cortical function is intact  Coordination: Finger to nose testing is performed accurately  Romberg is negative  Gait reveals a normal base with symmetrical arm swing  Tandem walk is normal   Reflexes:   2/4 in the biceps, triceps, brachioradialis, knee jerk and ankle jerk regions  Toes are downgoing            ROS:    Review of Systems   Constitutional: Positive for fatigue  Negative for appetite change and fever  HENT: Positive for congestion  Negative for hearing loss, tinnitus, trouble swallowing and voice change  Eyes: Positive for photophobia, pain and visual disturbance (double vision)  Respiratory: Negative  Negative for shortness of breath  Cardiovascular: Negative  Negative for palpitations  Gastrointestinal: Negative  Negative for nausea and vomiting  Endocrine: Negative  Negative for cold intolerance  Genitourinary: Negative  Negative for dysuria, frequency and urgency  Musculoskeletal: Negative  Negative for back pain, gait problem, myalgias and neck pain  Skin: Negative  Negative for rash  Neurological: Positive for headaches  Negative for dizziness, tremors, seizures, syncope, facial asymmetry, speech difficulty, weakness, light-headedness and numbness  Hematological: Negative  Does not bruise/bleed easily  Psychiatric/Behavioral: Negative  Negative for confusion, decreased concentration, hallucinations and sleep disturbance

## 2021-04-20 LAB — B BURGDOR IGG+IGM SER-ACNC: 22

## 2021-04-21 LAB
ACHR BIND AB SER-SCNC: 0.06 NMOL/L (ref 0–0.24)
RYE IGE QN: NEGATIVE

## 2021-04-22 LAB — ACHR BLOCK AB/ACHR TOTAL SFR SER: 12 % (ref 0–25)

## 2021-04-23 LAB — ACHR MOD AB/ACHR TOTAL SFR SER: <12 % (ref 0–20)

## 2021-05-05 ENCOUNTER — HOSPITAL ENCOUNTER (OUTPATIENT)
Dept: MRI IMAGING | Facility: CLINIC | Age: 46
Discharge: HOME | End: 2021-05-05
Payer: COMMERCIAL

## 2021-05-05 DIAGNOSIS — H53.2 DIPLOPIA: ICD-10-CM

## 2021-05-05 DIAGNOSIS — G44.52 NEW PERSISTENT DAILY HEADACHE: ICD-10-CM

## 2021-05-05 PROCEDURE — A9585 GADOBUTROL INJECTION: HCPCS | Performed by: PSYCHIATRY & NEUROLOGY

## 2021-05-05 PROCEDURE — 70553 MRI BRAIN STEM W/O & W/DYE: CPT

## 2021-05-05 PROCEDURE — 70543 MRI ORBT/FAC/NCK W/O &W/DYE: CPT

## 2021-05-05 PROCEDURE — G1004 CDSM NDSC: HCPCS

## 2021-05-05 RX ADMIN — GADOBUTROL 7 ML: 604.72 INJECTION INTRAVENOUS at 13:47

## 2021-05-10 ENCOUNTER — TELEPHONE (OUTPATIENT)
Dept: NEUROLOGY | Facility: CLINIC | Age: 46
End: 2021-05-10

## 2021-05-10 DIAGNOSIS — G96.811 SPONTANEOUS INTRACRANIAL HYPOTENSION: Primary | ICD-10-CM

## 2021-05-10 NOTE — TELEPHONE ENCOUNTER
Radiology reading room called regarding significant findings on MRI of Brain   Wanted to make you aware

## 2021-05-10 NOTE — TELEPHONE ENCOUNTER
Spoke with patient and discussed results of MRI which demonstrates spontaneous intracranial hypotension    Referral placed for neuro surgical evaluation

## 2021-05-26 ENCOUNTER — OFFICE VISIT (OUTPATIENT)
Dept: NEUROLOGY | Facility: CLINIC | Age: 46
End: 2021-05-26
Payer: COMMERCIAL

## 2021-05-26 VITALS
SYSTOLIC BLOOD PRESSURE: 100 MMHG | HEART RATE: 70 BPM | DIASTOLIC BLOOD PRESSURE: 50 MMHG | BODY MASS INDEX: 25.33 KG/M2 | WEIGHT: 152 LBS | HEIGHT: 65 IN

## 2021-05-26 DIAGNOSIS — G96.811 SPONTANEOUS INTRACRANIAL HYPOTENSION: Primary | ICD-10-CM

## 2021-05-26 DIAGNOSIS — H53.2 DIPLOPIA: ICD-10-CM

## 2021-05-26 PROCEDURE — 99213 OFFICE O/P EST LOW 20 MIN: CPT | Performed by: PSYCHIATRY & NEUROLOGY

## 2021-05-26 NOTE — PROGRESS NOTES
Lucrecia Salazar is a 39 y o  female   Returns in follow-up today with history of headache and diplopia    Assessment:  1  Spontaneous intracranial hypotension    2  Diplopia        Plan:   neurosurgery appointment   Follow-up as needed    Discussion:   Lor Colmenares reports that the intensity of her headache has lessened and she no longer has symptoms of double vision but does report some muffling of the year on the left side  Blood work was unremarkable and her MRI brain demonstrated changes consistent with  Cerebral hypotension  Referrals were made to Neurosurgery and she has an appointment pending in the near future  We discussed the rationale for that her symptoms and the potential causes  She will keep her Neurosurgery appointment and would like to follow up with me on an as-needed basis      Subjective:    WALLY Colmenares returns in follow-up today  She reports that she continues to have headaches on a daily basis but they are much less intense  She states she rarely needs to take medication for the headache  She does find that when she wakes up in the morning and is laying in bed she has no headache but the headache starts shortly after she gets up  She reports no longer having symptoms of double vision and states that she continues to have a muffled sensation her left ear  Blood work including acetylcholine receptor antibody titers were negative  Her brain MRI was remarkable for changes consistent with cerebral hypotension        Past Medical History:   Diagnosis Date    Pneumothorax on right 1       Family History:  Family History   Problem Relation Age of Onset    Stomach cancer Brother     Migraines Mother     Cirrhosis Father     No Known Problems Sister     No Known Problems Sister     No Known Problems Brother        Past Surgical History:  Past Surgical History:   Procedure Laterality Date    DILATION AND CURETTAGE OF UTERUS      MYOMECTOMY         Social History:   reports that she has never smoked  She has never used smokeless tobacco  She reports that she does not drink alcohol or use drugs  Allergies:  Patient has no known allergies  Current Outpatient Medications:     fluticasone (FLONASE) 50 mcg/act nasal spray, 1 spray into each nostril daily, Disp: 16 g, Rfl: 2    Phenylephrine-Ibuprofen (ADVIL SINUS CONGESTION & PAIN PO), Take by mouth daily, Disp: , Rfl:     diphenhydrAMINE (BENADRYL) 25 mg tablet, Take 1 tablet (25 mg total) by mouth every 6 (six) hours as needed for itching (headache/take with metoclopramide/initial rx ) (Patient not taking: Reported on 4/19/2021), Disp: 12 tablet, Rfl: 0    ibuprofen (MOTRIN) 400 mg tablet, Take 1 tablet (400 mg total) by mouth every 6 (six) hours as needed for moderate pain or headaches for up to 5 days, Disp: 12 tablet, Rfl: 0    metoclopramide (REGLAN) 5 mg tablet, Take 1 tablet (5 mg total) by mouth every 6 (six) hours as needed (headache/nausea/vomiting ) (Patient not taking: Reported on 4/19/2021), Disp: 12 tablet, Rfl: 0    SUMAtriptan (IMITREX) 50 mg tablet, Take 1 tab at onset of migraines, may repeat in 2 hours if needed for 1 dose  (Patient not taking: Reported on 4/19/2021), Disp: 9 tablet, Rfl: 0      I have reviewed the past medical, social and family history, current medications, allergies, vitals, review of systems and updated this information as appropriate today     Objective:    Vitals:  Blood pressure 100/50, pulse 70, height 5' 5" (1 651 m), weight 68 9 kg (152 lb)  Physical Exam    Neurological Exam   GENERAL:  Well-developed well-nourished woman in no acute distress  HEENT/NECK: Head is atraumatic normocephalic, neck is supple  NEUROLOGIC:  Mental Status: Awake and alert without aphasia  Cranial Nerves: Extraocular movements are full  Face is symmetrical  Coordination:   Gait is stable            ROS:    Review of Systems   Constitutional: Negative  Negative for appetite change and fever     HENT: Positive for ear pain (left ear feels clogged)  Negative for hearing loss, tinnitus, trouble swallowing and voice change  Eyes: Negative  Negative for photophobia and pain  Respiratory: Negative  Negative for shortness of breath  Cardiovascular: Negative  Negative for palpitations  Gastrointestinal: Negative  Negative for nausea and vomiting  Endocrine: Negative  Negative for cold intolerance  Genitourinary: Negative  Negative for dysuria, frequency and urgency  Musculoskeletal: Negative  Negative for myalgias and neck pain  Skin: Negative  Negative for rash  Neurological: Positive for headaches (daily but lesss severe)  Negative for dizziness, tremors, seizures, syncope, facial asymmetry, speech difficulty, weakness, light-headedness and numbness  Hematological: Negative  Does not bruise/bleed easily  Psychiatric/Behavioral: Negative for confusion, hallucinations and sleep disturbance

## 2021-06-07 ENCOUNTER — CONSULT (OUTPATIENT)
Dept: NEUROSURGERY | Facility: CLINIC | Age: 46
End: 2021-06-07
Payer: COMMERCIAL

## 2021-06-07 VITALS
SYSTOLIC BLOOD PRESSURE: 102 MMHG | HEART RATE: 67 BPM | WEIGHT: 151 LBS | HEIGHT: 65 IN | RESPIRATION RATE: 16 BRPM | DIASTOLIC BLOOD PRESSURE: 58 MMHG | BODY MASS INDEX: 25.16 KG/M2 | TEMPERATURE: 98.7 F

## 2021-06-07 DIAGNOSIS — G96.811 SPONTANEOUS INTRACRANIAL HYPOTENSION: ICD-10-CM

## 2021-06-07 PROCEDURE — 3008F BODY MASS INDEX DOCD: CPT | Performed by: RADIOLOGY

## 2021-06-07 PROCEDURE — 1036F TOBACCO NON-USER: CPT | Performed by: RADIOLOGY

## 2021-06-07 PROCEDURE — 99245 OFF/OP CONSLTJ NEW/EST HI 55: CPT | Performed by: RADIOLOGY

## 2021-06-07 RX ORDER — SODIUM CHLORIDE 9 MG/ML
75 INJECTION, SOLUTION INTRAVENOUS CONTINUOUS
Status: CANCELLED | OUTPATIENT
Start: 2021-06-07

## 2021-06-07 NOTE — PROGRESS NOTES
Assessment/Plan:     Diagnoses and all orders for this visit:    Spontaneous intracranial hypotension  -     Ambulatory referral to Neurosurgery  -     IR spine procedure; Future    Other orders  -     Diet NPO; Sips with meds; Standing  -     Nursing communication Apply gown prior to procedure; Standing  -     Have Patient Void On Call to Procedure Room; Standing  -     Insert and Maintain IV; Standing  -     sodium chloride 0 9 % infusion          Discussion Summary:   Kari Santos has persistent symptoms of a CSF leak despite recent improvement  We discussed performing an epidural blood patch prior to any further investigative procedure to determine a source of leak  She understands the risks and has elected to proceed  She reports being very nervous and anxious for the procedure and I have explained we will consider using sedation at the time of the procedure  Chief Complaint: Consult      Patient ID: Danica Romero is a 39 y o  female    HPI  Ms  Lily Scott presents for evaluation of symptomatic CSF leak  She has been suffering with migraines with nausea and vomiting rather suddenly since late March  Headaches always worse with standing, relieved with laying down  Then started developing neck pain with headaches shortly thereafter  In April developed occasional double vision and left ear "stuffed up " Headaches in general are not as intense as prior  All symptoms essentially much improved but not resolved  No rhinorrhea or otorrhea  Had diagnosis of eustachian tube disorder however  Referred to Neurology for migraines  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  Suffered from a spontaneous PTX in 2015  Had severe bleeding with chest tube insertion attempt and recurrent PTX over the course of a few weeks  Review of Systems   HENT: Positive for hearing loss (left ear felt muffled)  Negative for tinnitus  Eyes: Positive for visual disturbance (double vision)  Respiratory: Negative  Cardiovascular: Negative  Gastrointestinal: Negative  Endocrine: Negative  Genitourinary: Negative  Musculoskeletal: Negative  Skin: Negative  Allergic/Immunologic: Negative  Neurological: Positive for numbness (when she wakes up her hands are numb) and headaches (migraines)  Negative for dizziness, tremors, seizures, syncope, speech difficulty, weakness and light-headedness  Hematological: Bruises/bleeds easily (bruise)  Psychiatric/Behavioral: Negative  I have personally reviewed the MA's review of systems and made changes as necessary      The following portions of the patient's history were reviewed and updated as appropriate: allergies, current medications, past family history, past medical history, past social history, past surgical history and problem list       Active Ambulatory Problems     Diagnosis Date Noted    Migraine without aura and without status migrainosus, not intractable 04/08/2021    Eustachian tube disorder, left 04/08/2021    Spontaneous intracranial hypotension 06/07/2021     Resolved Ambulatory Problems     Diagnosis Date Noted    No Resolved Ambulatory Problems     Past Medical History:   Diagnosis Date    Pneumothorax on right 2015       Past Surgical History:   Procedure Laterality Date    DILATION AND CURETTAGE OF UTERUS      MYOMECTOMY         Current Outpatient Medications   Medication Sig Dispense Refill    fluticasone (FLONASE) 50 mcg/act nasal spray 1 spray into each nostril daily 16 g 2    ibuprofen (MOTRIN) 400 mg tablet Take 1 tablet (400 mg total) by mouth every 6 (six) hours as needed for moderate pain or headaches for up to 5 days 12 tablet 0    Phenylephrine-Ibuprofen (ADVIL SINUS CONGESTION & PAIN PO) Take by mouth daily      diphenhydrAMINE (BENADRYL) 25 mg tablet Take 1 tablet (25 mg total) by mouth every 6 (six) hours as needed for itching (headache/take with metoclopramide/initial rx ) (Patient not taking: Reported on 4/19/2021) 12 tablet 0    metoclopramide (REGLAN) 5 mg tablet Take 1 tablet (5 mg total) by mouth every 6 (six) hours as needed (headache/nausea/vomiting ) (Patient not taking: Reported on 4/19/2021) 12 tablet 0    SUMAtriptan (IMITREX) 50 mg tablet Take 1 tab at onset of migraines, may repeat in 2 hours if needed for 1 dose  (Patient not taking: Reported on 4/19/2021) 9 tablet 0     No current facility-administered medications for this visit  Vitals:    06/07/21 1511   BP: 102/58   Pulse: 67   Resp: 16   Temp: 98 7 °F (37 1 °C)         Objective:    Physical Exam  Constitutional:       Appearance: Normal appearance  Eyes:      Extraocular Movements: Extraocular movements intact  Pupils: Pupils are equal, round, and reactive to light  Pulmonary:      Effort: Pulmonary effort is normal    Neurological:      General: No focal deficit present  Mental Status: She is alert and oriented to person, place, and time  Sensory: No sensory deficit  Motor: No weakness  Gait: Gait normal    Psychiatric:         Mood and Affect: Mood normal          Behavior: Behavior normal          Thought Content: Thought content normal          Judgment: Judgment normal        Neurologic Exam     Mental Status   Oriented to person, place, and time  Cranial Nerves     CN III, IV, VI   Pupils are equal, round, and reactive to light  Results/Data:  I have reviewed the results and images from the MRI in detail with the patient

## 2021-06-08 ENCOUNTER — TELEPHONE (OUTPATIENT)
Dept: RADIOLOGY | Facility: HOSPITAL | Age: 46
End: 2021-06-08

## 2021-06-08 DIAGNOSIS — G96.811 SPONTANEOUS INTRACRANIAL HYPOTENSION: Primary | ICD-10-CM

## 2021-06-09 ENCOUNTER — TELEPHONE (OUTPATIENT)
Dept: RADIOLOGY | Facility: HOSPITAL | Age: 46
End: 2021-06-09

## 2021-06-09 NOTE — PRE-PROCEDURE INSTRUCTIONS
Phone Consult completed:Pre procedure instructions for blood patch reviewed with verbal understanding  Allergies,meds,NPO, and ride  Approximate arrival time given,SDS phone call evening before procedure  COVID screening completed no vaccine at present

## 2021-06-10 ENCOUNTER — APPOINTMENT (OUTPATIENT)
Dept: LAB | Facility: CLINIC | Age: 46
End: 2021-06-10
Payer: COMMERCIAL

## 2021-06-10 DIAGNOSIS — G96.811 SPONTANEOUS INTRACRANIAL HYPOTENSION: ICD-10-CM

## 2021-06-10 LAB
ANION GAP SERPL CALCULATED.3IONS-SCNC: 2 MMOL/L (ref 4–13)
APTT PPP: 30 SECONDS (ref 23–37)
BASOPHILS # BLD AUTO: 0.03 THOUSANDS/ΜL (ref 0–0.1)
BASOPHILS NFR BLD AUTO: 0 % (ref 0–1)
BUN SERPL-MCNC: 15 MG/DL (ref 5–25)
CALCIUM SERPL-MCNC: 9.1 MG/DL (ref 8.3–10.1)
CHLORIDE SERPL-SCNC: 111 MMOL/L (ref 100–108)
CO2 SERPL-SCNC: 26 MMOL/L (ref 21–32)
CREAT SERPL-MCNC: 0.68 MG/DL (ref 0.6–1.3)
EOSINOPHIL # BLD AUTO: 0.07 THOUSAND/ΜL (ref 0–0.61)
EOSINOPHIL NFR BLD AUTO: 1 % (ref 0–6)
ERYTHROCYTE [DISTWIDTH] IN BLOOD BY AUTOMATED COUNT: 13 % (ref 11.6–15.1)
EST. AVERAGE GLUCOSE BLD GHB EST-MCNC: 100 MG/DL
GFR SERPL CREATININE-BSD FRML MDRD: 124 ML/MIN/1.73SQ M
GLUCOSE SERPL-MCNC: 101 MG/DL (ref 65–140)
HBA1C MFR BLD: 5.1 %
HCG SERPL QL: NEGATIVE
HCT VFR BLD AUTO: 35.9 % (ref 34.8–46.1)
HGB BLD-MCNC: 11.4 G/DL (ref 11.5–15.4)
IMM GRANULOCYTES # BLD AUTO: 0.02 THOUSAND/UL (ref 0–0.2)
IMM GRANULOCYTES NFR BLD AUTO: 0 % (ref 0–2)
INR PPP: 0.99 (ref 0.84–1.19)
LYMPHOCYTES # BLD AUTO: 2.6 THOUSANDS/ΜL (ref 0.6–4.47)
LYMPHOCYTES NFR BLD AUTO: 28 % (ref 14–44)
MCH RBC QN AUTO: 30.9 PG (ref 26.8–34.3)
MCHC RBC AUTO-ENTMCNC: 31.8 G/DL (ref 31.4–37.4)
MCV RBC AUTO: 97 FL (ref 82–98)
MONOCYTES # BLD AUTO: 0.87 THOUSAND/ΜL (ref 0.17–1.22)
MONOCYTES NFR BLD AUTO: 9 % (ref 4–12)
NEUTROPHILS # BLD AUTO: 5.65 THOUSANDS/ΜL (ref 1.85–7.62)
NEUTS SEG NFR BLD AUTO: 62 % (ref 43–75)
NRBC BLD AUTO-RTO: 0 /100 WBCS
PLATELET # BLD AUTO: 210 THOUSANDS/UL (ref 149–390)
PMV BLD AUTO: 11.2 FL (ref 8.9–12.7)
POTASSIUM SERPL-SCNC: 4.9 MMOL/L (ref 3.5–5.3)
PROTHROMBIN TIME: 13.1 SECONDS (ref 11.6–14.5)
RBC # BLD AUTO: 3.69 MILLION/UL (ref 3.81–5.12)
SODIUM SERPL-SCNC: 139 MMOL/L (ref 136–145)
WBC # BLD AUTO: 9.24 THOUSAND/UL (ref 4.31–10.16)

## 2021-06-10 PROCEDURE — 36415 COLL VENOUS BLD VENIPUNCTURE: CPT

## 2021-06-10 PROCEDURE — 83036 HEMOGLOBIN GLYCOSYLATED A1C: CPT

## 2021-06-10 PROCEDURE — 84703 CHORIONIC GONADOTROPIN ASSAY: CPT

## 2021-06-10 PROCEDURE — 85025 COMPLETE CBC W/AUTO DIFF WBC: CPT

## 2021-06-10 PROCEDURE — 85730 THROMBOPLASTIN TIME PARTIAL: CPT

## 2021-06-10 PROCEDURE — 80048 BASIC METABOLIC PNL TOTAL CA: CPT

## 2021-06-10 PROCEDURE — 85610 PROTHROMBIN TIME: CPT

## 2021-06-16 ENCOUNTER — HOSPITAL ENCOUNTER (OUTPATIENT)
Dept: RADIOLOGY | Facility: HOSPITAL | Age: 46
Discharge: HOME | End: 2021-06-16
Attending: RADIOLOGY | Admitting: RADIOLOGY
Payer: COMMERCIAL

## 2021-06-16 VITALS
OXYGEN SATURATION: 100 % | TEMPERATURE: 98.6 F | BODY MASS INDEX: 25.61 KG/M2 | SYSTOLIC BLOOD PRESSURE: 100 MMHG | DIASTOLIC BLOOD PRESSURE: 61 MMHG | HEART RATE: 60 BPM | WEIGHT: 150 LBS | RESPIRATION RATE: 16 BRPM | HEIGHT: 64 IN

## 2021-06-16 DIAGNOSIS — G96.811 SPONTANEOUS INTRACRANIAL HYPOTENSION: ICD-10-CM

## 2021-06-16 PROCEDURE — 99153 MOD SED SAME PHYS/QHP EA: CPT

## 2021-06-16 PROCEDURE — 77003 FLUOROGUIDE FOR SPINE INJECT: CPT

## 2021-06-16 PROCEDURE — 36620 INSERTION CATHETER ARTERY: CPT | Performed by: RADIOLOGY

## 2021-06-16 PROCEDURE — 77003 FLUOROGUIDE FOR SPINE INJECT: CPT | Performed by: RADIOLOGY

## 2021-06-16 PROCEDURE — 99152 MOD SED SAME PHYS/QHP 5/>YRS: CPT

## 2021-06-16 PROCEDURE — 62273 INJECT EPIDURAL PATCH: CPT | Performed by: RADIOLOGY

## 2021-06-16 PROCEDURE — 62273 INJECT EPIDURAL PATCH: CPT

## 2021-06-16 RX ORDER — SODIUM CHLORIDE 9 MG/ML
75 INJECTION, SOLUTION INTRAVENOUS CONTINUOUS
Status: DISCONTINUED | OUTPATIENT
Start: 2021-06-16 | End: 2021-06-16 | Stop reason: HOSPADM

## 2021-06-16 RX ORDER — MIDAZOLAM HYDROCHLORIDE 2 MG/2ML
INJECTION, SOLUTION INTRAMUSCULAR; INTRAVENOUS CODE/TRAUMA/SEDATION MEDICATION
Status: COMPLETED | OUTPATIENT
Start: 2021-06-16 | End: 2021-06-16

## 2021-06-16 RX ORDER — ONDANSETRON 2 MG/ML
INJECTION INTRAMUSCULAR; INTRAVENOUS CODE/TRAUMA/SEDATION MEDICATION
Status: COMPLETED | OUTPATIENT
Start: 2021-06-16 | End: 2021-06-16

## 2021-06-16 RX ORDER — FENTANYL CITRATE 50 UG/ML
INJECTION, SOLUTION INTRAMUSCULAR; INTRAVENOUS CODE/TRAUMA/SEDATION MEDICATION
Status: COMPLETED | OUTPATIENT
Start: 2021-06-16 | End: 2021-06-16

## 2021-06-16 RX ADMIN — FENTANYL CITRATE 50 MCG: 50 INJECTION INTRAMUSCULAR; INTRAVENOUS at 12:18

## 2021-06-16 RX ADMIN — FENTANYL CITRATE 50 MCG: 50 INJECTION INTRAMUSCULAR; INTRAVENOUS at 12:58

## 2021-06-16 RX ADMIN — MIDAZOLAM 1 MG: 1 INJECTION INTRAMUSCULAR; INTRAVENOUS at 12:18

## 2021-06-16 RX ADMIN — ONDANSETRON 4 MG: 2 INJECTION INTRAMUSCULAR; INTRAVENOUS at 12:59

## 2021-06-16 RX ADMIN — FENTANYL CITRATE 50 MCG: 50 INJECTION INTRAMUSCULAR; INTRAVENOUS at 12:07

## 2021-06-16 RX ADMIN — IOHEXOL 2 ML: 300 INJECTION, SOLUTION INTRAVENOUS at 13:32

## 2021-06-16 RX ADMIN — MIDAZOLAM 1 MG: 1 INJECTION INTRAMUSCULAR; INTRAVENOUS at 12:07

## 2021-06-16 RX ADMIN — SODIUM CHLORIDE 75 ML/HR: 0.9 INJECTION, SOLUTION INTRAVENOUS at 10:37

## 2021-06-16 RX ADMIN — FENTANYL CITRATE 50 MCG: 50 INJECTION INTRAMUSCULAR; INTRAVENOUS at 12:59

## 2021-06-16 NOTE — DISCHARGE INSTRUCTIONS
Epidural Blood Patch   WHAT YOU NEED TO KNOW:   An epidural blood patch is a procedure used to relieve a headache caused by spinal fluid leak after a dural puncture  A healthcare provider will inject a sample of your own blood into your back, near the dural puncture site  The blood will clot, which may patch the leak  An epidural blood patch may also help reduce other spinal fluid leak symptoms, such as nausea, vomiting, hearing or vision trouble, or a stiff neck  WHILE YOU ARE HERE:   Before the procedure:   · Informed consent is a legal document that explains the tests, treatments, or procedures that you may need  Informed consent means you understand what will be done and can make decisions about what you want  You give your permission when you sign the consent form  You can have someone sign this form for you if you are not able to sign it  You have the right to understand your medical care in words you know  Before you sign the consent form, understand the risks and benefits of what will be done  Make sure all your questions are answered  · You may be given caffeine to drink or as a medicine through an IV  Caffeine causes your veins to constrict (narrow), which could help improve your symptoms  · You may be given medicine to decrease pain and nausea  During the procedure: Your healthcare provider will ask you to sit down, lie on your side, or lie on your stomach  He will then locate the area of your spine where he will inject the blood  This will likely be right below your dural puncture site, in between the vertebrae  You may need an x-ray to show the right area to place the epidural blood patch  Your healthcare provider will draw blood from a vein in your arm  He will then slowly inject the blood into the area of your spine near the puncture site  Tell your healthcare provider if you feel back or neck pain, or pain that spreads down your legs   Also tell your healthcare provider if your headache gets worse or you feel pressure  After the procedure:   · You may need a spinal CT scan to check the blood patch  You will need to lie still and flat on your back for 2 to 24 hours after your procedure  You may also be directed to elevate your legs  Do not get up to walk until healthcare providers tell you it is okay  · Your headache may improve immediately or within a few days  You may have mild back, neck, or leg pain, or a higher temperature for 1 to 2 days after your procedure  Ask your healthcare provider if you may use NSAIDs for pain and fever  RISKS:   Rarely, you may be at risk for an infection in the injection site  Without treatment, your spinal fluid may continue to leak  This may increase your risk of infection  Your headache may lead to a blood clot near your brain  A severe leak could lead to seizures and may be life-threatening  You may need a second blood patch procedure if a large amount of spinal fluid has leaked  You may need surgery to repair your dural damage  CARE AGREEMENT:   Procedural Sedation   WHAT YOU NEED TO KNOW:   Procedural sedation is medicine used during procedures to help you feel relaxed and calm  You will remember little to none of the procedure  After sedation you may feel tired, weak, or unsteady on your feet  You may also have trouble concentrating or short-term memory loss  These symptoms should go away in 24 hours or less  DISCHARGE INSTRUCTIONS:     Call 911 or have someone else call for any of the following:   · You have sudden trouble breathing      · You cannot be woken        Contact Interventional Radiology at [phone]   [facility] PATIENTS: Contact Interventional Radiology at [phone]) [facility] Ann PATIENTS: Contact Interventional Radiology at [phone]) if any of the following occur:     · You have a severe headache or dizziness      · Your heart is beating faster than usual     · You have a fever or chills      · Your skin is itchy, swollen, or you have a rash      · You have nausea or are vomiting for more than 8 hours after the procedure       · You have questions or concerns about your condition or care  Self-care:   · Have someone stay with you for 24 hours  This person can drive you to errands and help you do things around the house  This person can also watch for problems       · Rest and do quiet activities for 24 hours  Do not exercise, ride a bike, or play sports  Stand up slowly to prevent dizziness and falls  Take short walks around the house with another person  Slowly return to your usual activities the next day       · Do not drive or use dangerous machines or tools for 24 hours  You may injure yourself or others  Examples include a lawnmower, saw, or drill  Do not return to work for 24 hours if you use dangerous machines or tools for work       · Do not make important decisions for 24 hours  For example, do not sign important papers or invest money       · Drink liquids as directed  Liquids help flush the sedation medicine out of your body  Ask how much liquid to drink each day and which liquids are best for you       · Eat small, frequent meals to prevent nausea and vomiting  Start with clear liquids such as juice or broth  If you do not vomit after clear liquids, you can eat your usual foods       · Do not drink alcohol or take medicines that make you drowsy  This includes medicines that help you sleep and anxiety medicines  Ask your healthcare provider if it is safe for you to take pain medicine  Follow up with your healthcare provider as directed: Write down your questions so you remember to ask them during your visits

## 2021-06-16 NOTE — BRIEF OP NOTE (RAD/CATH)
IR SPINE PROCEDURE Procedure Note    PATIENT NAME: Cathie Clark  : 1975  MRN: [id number]    Pre-op Diagnosis:   1  Spontaneous intracranial hypotension      Post-op Diagnosis:   1  Spontaneous intracranial hypotension        Surgeon:   Rommel Perkins MD  Assistants:     No qualified resident was available, Resident is only observing    Estimated Blood Loss: 5 cc  Findings:   Successful lumbar epidural blood patch  Successful right radial a-line removal with closure device applied       Specimens: none    Complications:  none    Anesthesia: conscious sedation    Rommel Perkins MD     Date: 2021  Time: 1:03 PM

## 2021-06-16 NOTE — SEDATION DOCUMENTATION
Epidural Blood Patch Completed by Dr Darien Castle  Right Radial artery used as approach to obtain blood for blood patch  TR band on intact with patent hemostasis  VSS  Report to [facility] in short stay  15 ml air in TR band  Bedrest supine HOB flat x 1 hour as per Dr Darien Castle  Medicated with Fentanyl for pain/pressure towards end of procedure  Also medicated with Ondansetron 4 mg IVP for nausea  Denies pain or pressure at this time

## 2021-07-06 ENCOUNTER — OFFICE VISIT (OUTPATIENT)
Dept: NEUROSURGERY | Facility: CLINIC | Age: 46
End: 2021-07-06
Payer: COMMERCIAL

## 2021-07-06 VITALS
SYSTOLIC BLOOD PRESSURE: 100 MMHG | TEMPERATURE: 97.1 F | DIASTOLIC BLOOD PRESSURE: 60 MMHG | BODY MASS INDEX: 26.12 KG/M2 | HEIGHT: 64 IN | WEIGHT: 153 LBS | HEART RATE: 64 BPM

## 2021-07-06 DIAGNOSIS — G96.811 SPONTANEOUS INTRACRANIAL HYPOTENSION: Primary | ICD-10-CM

## 2021-07-06 PROCEDURE — 99214 OFFICE O/P EST MOD 30 MIN: CPT | Performed by: RADIOLOGY

## 2021-07-06 PROCEDURE — 3008F BODY MASS INDEX DOCD: CPT | Performed by: RADIOLOGY

## 2021-07-06 NOTE — PROGRESS NOTES
Assessment/Plan:     Diagnoses and all orders for this visit:    Spontaneous intracranial hypotension  -     MRI brain with and without contrast; Future        Discussion Summary:   Jennifer Lal is doing very well following blood patching  She appears to have near complete resolution of her symptoms  We discussed obtaining a 6 week follow-up MRI brain versus waiting to see if she becomes symptomatic again  She would like to schedule the MRI at this moment and will return in 4 weeks  Chief Complaint: Follow-up (After blood patch )      Patient ID: Lee Kim is a 39 y o  female    HPI     Ms Hawa Pride returns for follow-up of her suspected CSF leak  She is approximately 2 weeks since lumbar epidural blood patching  She reports resolution of her headaches  Able to tolerate being upright  No new leg weakness or numbness  No back pain  Developed focal scars on right arm following IV insertion attempts  No wrist, radial puncture site issues  Review of Systems   HENT: Negative for hearing loss (left ear felt muffled) and tinnitus  Eyes: Negative for visual disturbance (double vision)  Respiratory: Negative  Cardiovascular: Negative  Gastrointestinal: Negative  Endocrine: Negative  Genitourinary: Negative  Musculoskeletal: Negative  Skin: Negative  Allergic/Immunologic: Negative  Neurological: Negative for dizziness, tremors, seizures, syncope, facial asymmetry, speech difficulty, weakness, light-headedness, numbness (when she wakes up her hands are numb) and headaches (migraines)  Hematological: Bruises/bleeds easily (bruise)  Psychiatric/Behavioral: Negative  I have personally reviewed the MA's review of systems and made changes as necessary      The following portions of the patient's history were reviewed and updated as appropriate: allergies, current medications, past family history, past medical history, past social history, past surgical history and problem list       Active Ambulatory Problems     Diagnosis Date Noted    Migraine without aura and without status migrainosus, not intractable 04/08/2021    Eustachian tube disorder, left 04/08/2021    Spontaneous intracranial hypotension 06/07/2021     Resolved Ambulatory Problems     Diagnosis Date Noted    No Resolved Ambulatory Problems     Past Medical History:   Diagnosis Date    Pneumothorax on right 2015       Past Surgical History:   Procedure Laterality Date    DILATION AND CURETTAGE OF UTERUS      IR SPINE PROCEDURE  6/16/2021    MYOMECTOMY         Current Outpatient Medications   Medication Sig Dispense Refill    diphenhydrAMINE (BENADRYL) 25 mg tablet Take 1 tablet (25 mg total) by mouth every 6 (six) hours as needed for itching (headache/take with metoclopramide/initial rx ) (Patient not taking: Reported on 4/19/2021) 12 tablet 0    fluticasone (FLONASE) 50 mcg/act nasal spray 1 spray into each nostril daily 16 g 2    ibuprofen (MOTRIN) 400 mg tablet Take 1 tablet (400 mg total) by mouth every 6 (six) hours as needed for moderate pain or headaches for up to 5 days 12 tablet 0    metoclopramide (REGLAN) 5 mg tablet Take 1 tablet (5 mg total) by mouth every 6 (six) hours as needed (headache/nausea/vomiting ) (Patient not taking: Reported on 4/19/2021) 12 tablet 0    Phenylephrine-Ibuprofen (ADVIL SINUS CONGESTION & PAIN PO) Take by mouth daily      SUMAtriptan (IMITREX) 50 mg tablet Take 1 tab at onset of migraines, may repeat in 2 hours if needed for 1 dose  (Patient not taking: Reported on 4/19/2021) 9 tablet 0     No current facility-administered medications for this visit  There were no vitals filed for this visit  Objective:    Physical Exam  Constitutional:       Appearance: Normal appearance  HENT:      Head: Normocephalic and atraumatic  Eyes:      Extraocular Movements: Extraocular movements intact  Pupils: Pupils are equal, round, and reactive to light     Pulmonary: Effort: Pulmonary effort is normal    Musculoskeletal:         General: Normal range of motion  Skin:     General: Skin is warm and dry  Comments: Prominent scarring over puncture sites  Neurological:      General: No focal deficit present  Mental Status: She is alert and oriented to person, place, and time  Cranial Nerves: No cranial nerve deficit  Psychiatric:         Mood and Affect: Mood normal          Behavior: Behavior normal          Thought Content: Thought content normal          Judgment: Judgment normal        Neurologic Exam     Mental Status   Oriented to person, place, and time  Cranial Nerves     CN III, IV, VI   Pupils are equal, round, and reactive to light  Results/Data:  I have reviewed the results and images from the blood patch in detail with the patient

## 2021-08-02 ENCOUNTER — HOSPITAL ENCOUNTER (OUTPATIENT)
Dept: MRI IMAGING | Facility: CLINIC | Age: 46
Discharge: HOME | End: 2021-08-02
Payer: COMMERCIAL

## 2021-08-02 DIAGNOSIS — G96.811 SPONTANEOUS INTRACRANIAL HYPOTENSION: ICD-10-CM

## 2021-08-02 PROCEDURE — A9585 GADOBUTROL INJECTION: HCPCS | Performed by: RADIOLOGY

## 2021-08-02 PROCEDURE — G1004 CDSM NDSC: HCPCS

## 2021-08-02 PROCEDURE — 70553 MRI BRAIN STEM W/O & W/DYE: CPT

## 2021-08-02 RX ADMIN — GADOBUTROL 7 ML: 604.72 INJECTION INTRAVENOUS at 09:52

## 2021-08-06 ENCOUNTER — OFFICE VISIT (OUTPATIENT)
Dept: NEUROSURGERY | Facility: CLINIC | Age: 46
End: 2021-08-06
Payer: COMMERCIAL

## 2021-08-06 VITALS
WEIGHT: 151 LBS | DIASTOLIC BLOOD PRESSURE: 62 MMHG | TEMPERATURE: 97.3 F | SYSTOLIC BLOOD PRESSURE: 100 MMHG | RESPIRATION RATE: 16 BRPM | BODY MASS INDEX: 25.78 KG/M2 | HEIGHT: 64 IN

## 2021-08-06 DIAGNOSIS — G96.811 SPONTANEOUS INTRACRANIAL HYPOTENSION: Primary | ICD-10-CM

## 2021-08-06 PROCEDURE — 1036F TOBACCO NON-USER: CPT | Performed by: RADIOLOGY

## 2021-08-06 PROCEDURE — 3008F BODY MASS INDEX DOCD: CPT | Performed by: RADIOLOGY

## 2021-08-06 PROCEDURE — 99213 OFFICE O/P EST LOW 20 MIN: CPT | Performed by: RADIOLOGY

## 2021-08-06 NOTE — PROGRESS NOTES
status migrainosus, not intractable 04/08/2021    Eustachian tube disorder, left 04/08/2021    Spontaneous intracranial hypotension 06/07/2021     Resolved Ambulatory Problems     Diagnosis Date Noted    No Resolved Ambulatory Problems     Past Medical History:   Diagnosis Date    Pneumothorax on right 2015       Past Surgical History:   Procedure Laterality Date    DILATION AND CURETTAGE OF UTERUS      IR SPINE PROCEDURE  6/16/2021    MYOMECTOMY         Current Outpatient Medications   Medication Sig Dispense Refill    fluticasone (FLONASE) 50 mcg/act nasal spray 1 spray into each nostril daily 16 g 2    ibuprofen (MOTRIN) 400 mg tablet Take 1 tablet (400 mg total) by mouth every 6 (six) hours as needed for moderate pain or headaches for up to 5 days 12 tablet 0    Phenylephrine-Ibuprofen (ADVIL SINUS CONGESTION & PAIN PO) Take by mouth daily       SUMAtriptan (IMITREX) 50 mg tablet Take 1 tab at onset of migraines, may repeat in 2 hours if needed for 1 dose  9 tablet 0    diphenhydrAMINE (BENADRYL) 25 mg tablet Take 1 tablet (25 mg total) by mouth every 6 (six) hours as needed for itching (headache/take with metoclopramide/initial rx ) (Patient not taking: Reported on 4/19/2021) 12 tablet 0    metoclopramide (REGLAN) 5 mg tablet Take 1 tablet (5 mg total) by mouth every 6 (six) hours as needed (headache/nausea/vomiting ) (Patient not taking: Reported on 4/19/2021) 12 tablet 0     No current facility-administered medications for this visit  Vitals:    08/06/21 0809   BP: 100/62   Resp: 16   Temp: (!) 97 3 °F (36 3 °C)         Objective:    Physical Exam  Neurologic Exam    Results/Data:  I have reviewed the results and images from the MRI in detail with the patient

## 2022-02-10 ENCOUNTER — OFFICE VISIT (OUTPATIENT)
Dept: FAMILY MEDICINE CLINIC | Facility: CLINIC | Age: 47
End: 2022-02-10
Payer: COMMERCIAL

## 2022-02-10 ENCOUNTER — APPOINTMENT (OUTPATIENT)
Dept: LAB | Facility: CLINIC | Age: 47
End: 2022-02-10
Payer: COMMERCIAL

## 2022-02-10 VITALS
TEMPERATURE: 98.4 F | WEIGHT: 153.2 LBS | BODY MASS INDEX: 26.15 KG/M2 | HEIGHT: 64 IN | DIASTOLIC BLOOD PRESSURE: 64 MMHG | HEART RATE: 83 BPM | OXYGEN SATURATION: 98 % | SYSTOLIC BLOOD PRESSURE: 110 MMHG | RESPIRATION RATE: 16 BRPM

## 2022-02-10 DIAGNOSIS — Z32.01 POSITIVE PREGNANCY TEST: Primary | ICD-10-CM

## 2022-02-10 DIAGNOSIS — Z32.01 POSITIVE PREGNANCY TEST: ICD-10-CM

## 2022-02-10 LAB — B-HCG SERPL-ACNC: 6934 MIU/ML

## 2022-02-10 PROCEDURE — 84702 CHORIONIC GONADOTROPIN TEST: CPT

## 2022-02-10 PROCEDURE — 3725F SCREEN DEPRESSION PERFORMED: CPT | Performed by: NURSE PRACTITIONER

## 2022-02-10 PROCEDURE — 36415 COLL VENOUS BLD VENIPUNCTURE: CPT

## 2022-02-10 PROCEDURE — 3008F BODY MASS INDEX DOCD: CPT | Performed by: NURSE PRACTITIONER

## 2022-02-10 PROCEDURE — 1036F TOBACCO NON-USER: CPT | Performed by: NURSE PRACTITIONER

## 2022-02-10 PROCEDURE — 99213 OFFICE O/P EST LOW 20 MIN: CPT | Performed by: NURSE PRACTITIONER

## 2022-02-10 NOTE — PROGRESS NOTES
Assessment/Plan:    Positive pregnancy test  To obtain hCG quantitative and ultrasound, will call with results  Advised to make an appointment with Gynecology  Diagnoses and all orders for this visit:    Positive pregnancy test  -     hCG, quantitative; Future  -     Ambulatory referral to Gynecology; Future  -     US OB transvaginal; Future          Subjective:      Patient ID: Krista Rothman is a 42 y o  female  Chester Karimi is a  42year old  Her LMP:   She took her last home pregnancy test this morning which was positive  Denies cramping or spotting  She is requesting a referral to Gynecology as well as an ultrasound  Typically, her pregnancies have ended anywhere from 6-18 weeks  The following portions of the patient's history were reviewed and updated as appropriate:   She   Patient Active Problem List    Diagnosis Date Noted    Positive pregnancy test 02/10/2022    Spontaneous intracranial hypotension 2021    Migraine without aura and without status migrainosus, not intractable 2021    Eustachian tube disorder, left 2021     Current Outpatient Medications   Medication Sig Dispense Refill    diphenhydrAMINE (BENADRYL) 25 mg tablet Take 1 tablet (25 mg total) by mouth every 6 (six) hours as needed for itching (headache/take with metoclopramide/initial rx ) 12 tablet 0    fluticasone (FLONASE) 50 mcg/act nasal spray 1 spray into each nostril daily 16 g 2    metoclopramide (REGLAN) 5 mg tablet Take 1 tablet (5 mg total) by mouth every 6 (six) hours as needed (headache/nausea/vomiting ) 12 tablet 0    Phenylephrine-Ibuprofen (ADVIL SINUS CONGESTION & PAIN PO) Take by mouth daily       SUMAtriptan (IMITREX) 50 mg tablet Take 1 tab at onset of migraines, may repeat in 2 hours if needed for 1 dose   9 tablet 0    ibuprofen (MOTRIN) 400 mg tablet Take 1 tablet (400 mg total) by mouth every 6 (six) hours as needed for moderate pain or headaches for up to 5 days 12 tablet 0     No current facility-administered medications for this visit  She has No Known Allergies       Review of Systems   Constitutional: Negative  Respiratory: Negative  Cardiovascular: Negative  Gastrointestinal: Negative  Neurological: Negative  Psychiatric/Behavioral: Negative  /64   Pulse 83   Temp 98 4 °F (36 9 °C) (Tympanic)   Resp 16   Ht 5' 4" (1 626 m)   Wt 69 5 kg (153 lb 3 2 oz)   SpO2 98%   BMI 26 30 kg/m²     Objective:     Physical Exam  Vitals and nursing note reviewed  Constitutional:       General: She is not in acute distress  Appearance: Normal appearance  She is well-developed  She is not ill-appearing, toxic-appearing or diaphoretic  HENT:      Head: Normocephalic and atraumatic  Eyes:      Conjunctiva/sclera: Conjunctivae normal    Cardiovascular:      Rate and Rhythm: Normal rate and regular rhythm  Heart sounds: Normal heart sounds  No murmur heard  Pulmonary:      Effort: Pulmonary effort is normal  No respiratory distress  Breath sounds: Normal breath sounds  No wheezing or rales  Chest:      Chest wall: No tenderness  Musculoskeletal:      Cervical back: Neck supple  Neurological:      General: No focal deficit present  Mental Status: She is alert and oriented to person, place, and time  Psychiatric:         Mood and Affect: Mood normal          Behavior: Behavior normal          Thought Content:  Thought content normal          Judgment: Judgment normal

## 2022-02-10 NOTE — ASSESSMENT & PLAN NOTE
To obtain hCG quantitative and ultrasound, will call with results  Advised to make an appointment with Gynecology

## 2022-02-11 ENCOUNTER — HOSPITAL ENCOUNTER (OUTPATIENT)
Dept: ULTRASOUND IMAGING | Facility: HOSPITAL | Age: 47
Discharge: HOME | End: 2022-02-11
Payer: COMMERCIAL

## 2022-02-11 DIAGNOSIS — Z32.01 POSITIVE PREGNANCY TEST: ICD-10-CM

## 2022-02-11 PROCEDURE — 76815 OB US LIMITED FETUS(S): CPT

## 2022-02-15 ENCOUNTER — TELEPHONE (OUTPATIENT)
Dept: FAMILY MEDICINE CLINIC | Facility: CLINIC | Age: 47
End: 2022-02-15

## 2022-02-15 NOTE — TELEPHONE ENCOUNTER
----- Message from [id number] [employer] sent at 2/15/2022  9:15 AM EST -----  Please call patient to make aware that her hcg was elevated consistent with 3-4 weeks range  Please have her obtain the u/s and follow up with GYN   Thank you

## 2022-02-17 ENCOUNTER — TELEPHONE (OUTPATIENT)
Dept: FAMILY MEDICINE CLINIC | Facility: CLINIC | Age: 47
End: 2022-02-17

## 2022-02-17 NOTE — TELEPHONE ENCOUNTER
Telephone call to patient to review recent ultrasound due to positive pregnancy test and elevated hCG  Awaiting call back

## 2022-09-08 ENCOUNTER — OFFICE VISIT (OUTPATIENT)
Dept: FAMILY MEDICINE CLINIC | Facility: CLINIC | Age: 47
End: 2022-09-08
Payer: COMMERCIAL

## 2022-09-08 VITALS
HEIGHT: 64 IN | DIASTOLIC BLOOD PRESSURE: 72 MMHG | SYSTOLIC BLOOD PRESSURE: 110 MMHG | WEIGHT: 156.2 LBS | HEART RATE: 70 BPM | BODY MASS INDEX: 26.67 KG/M2 | OXYGEN SATURATION: 98 % | TEMPERATURE: 97.2 F

## 2022-09-08 DIAGNOSIS — H65.192 ACUTE EFFUSION OF LEFT EAR: Primary | ICD-10-CM

## 2022-09-08 PROCEDURE — 99213 OFFICE O/P EST LOW 20 MIN: CPT | Performed by: NURSE PRACTITIONER

## 2022-09-08 NOTE — PROGRESS NOTES
Assessment/Plan:    No problem-specific Assessment & Plan notes found for this encounter  Diagnoses and all orders for this visit:    Acute effusion of left ear  Comments:  Advised to avoid submerging head under water, blow nose often, avoid irritants  To return if symptoms worsen  Orders:  -     neomycin-polymyxin-hydrocortisone (CORTISPORIN) otic solution; Administer 4 drops into the left ear every 6 (six) hours          Subjective:      Patient ID: Kaylah Sunshine is a 42 y o  female  Patient presents to the office for evaluation of left ear discomfort x's 1 month  Notes discomfort daily, denies severe pain  Feels like her hearing is "muffled "  Denies recent swimming in pools  Denies hearing loss, fever, chills, drainage from ear, tinnitus  The following portions of the patient's history were reviewed and updated as appropriate: allergies, current medications, past family history, past medical history, past social history, past surgical history and problem list     Review of Systems   Constitutional: Negative for chills and fever  HENT: Positive for ear pain and hearing loss ("muffled" hearing)  Negative for congestion, sinus pressure, sinus pain, sneezing, sore throat, tinnitus and trouble swallowing  Eyes: Negative for pain and itching  Respiratory: Negative for cough and shortness of breath  Cardiovascular: Negative for chest pain and palpitations  Gastrointestinal: Negative for nausea and vomiting  Genitourinary: Negative for decreased urine volume  Musculoskeletal: Negative for arthralgias, myalgias, neck pain and neck stiffness  Skin: Negative for color change and rash  Neurological: Negative for dizziness, light-headedness and headaches  Psychiatric/Behavioral: Negative for confusion           Objective:      /72   Pulse 70   Temp (!) 97 2 °F (36 2 °C)   Ht 5' 4" (1 626 m)   Wt 70 9 kg (156 lb 3 2 oz)   SpO2 98%   BMI 26 81 kg/m²          Physical Exam  Vitals reviewed  Constitutional:       General: She is not in acute distress  Appearance: Normal appearance  She is not ill-appearing  HENT:      Head: Normocephalic and atraumatic  Right Ear: Tympanic membrane, ear canal and external ear normal  There is no impacted cerumen  Left Ear: Hearing, ear canal and external ear normal  A middle ear effusion is present  Nose: Nose normal  No congestion  Mouth/Throat:      Mouth: Mucous membranes are moist       Pharynx: Oropharynx is clear  No oropharyngeal exudate or posterior oropharyngeal erythema  Eyes:      Conjunctiva/sclera: Conjunctivae normal       Pupils: Pupils are equal, round, and reactive to light  Cardiovascular:      Rate and Rhythm: Normal rate and regular rhythm  Pulses: Normal pulses  Heart sounds: Normal heart sounds  No murmur heard  Pulmonary:      Effort: Pulmonary effort is normal       Breath sounds: Normal breath sounds  Musculoskeletal:         General: Normal range of motion  Cervical back: Normal range of motion and neck supple  Lymphadenopathy:      Cervical: No cervical adenopathy  Skin:     General: Skin is warm and dry  Capillary Refill: Capillary refill takes less than 2 seconds  Neurological:      General: No focal deficit present  Mental Status: She is alert and oriented to person, place, and time     Psychiatric:         Mood and Affect: Mood normal          Behavior: Behavior normal

## 2022-09-08 NOTE — PATIENT INSTRUCTIONS
Fluid In The Ear (Serous Otitis Media)   AMBULATORY CARE:   Serous otitis media [facility])  is fluid trapped in the middle of your ear behind your eardrum  This condition usually develops without signs or symptoms of an ear infection  Serous otitis media may be caused by an upper respiratory infection or allergies  It is most common in the fall and early spring  Signs and symptoms:   Trouble hearing    Sounds are muffled    Plugged ear or an ear that feels full    Ear discomfort or popping    Ringing or buzzing in your ear    Call your doctor if:   You develop severe ear pain  The outside of your ear is red or swollen  You have fluid coming from your ear  You have questions or concerns about your condition or care  Medicines: You may need any of the following:  Acetaminophen  decreases pain and fever  It is available without a doctor's order  Ask how much to take and how often to take it  Follow directions  Read the labels of all other medicines you are using to see if they also contain acetaminophen, or ask your doctor or pharmacist  Acetaminophen can cause liver damage if not taken correctly  Do not use more than 4 grams (4,000 milligrams) total of acetaminophen in one day  NSAIDs , such as ibuprofen, help decrease swelling, pain, and fever  This medicine is available with or without a doctor's order  NSAIDs can cause stomach bleeding or kidney problems in certain people  If you take blood thinner medicine, always ask your healthcare provider if NSAIDs are safe for you  Always read the medicine label and follow directions  Steroids  help decrease inflammation so the fluid can drain from your ear  Antibiotics  may be needed if a bacterial infection caused your BUZZ  How to stay healthy:   Wash your hands often throughout the day  Use soap and water  Rub your soapy hands together, lacing your fingers, for at least 20 seconds  Rinse with warm, running water   Dry your hands with a clean towel or paper towel  Use hand  that contains alcohol if soap and water are not available  Teach children how to wash their hands and use hand   Avoid people who are sick  Some germs are easily and quickly spread through contact  Follow up with your doctor as directed:  Write down your questions so you remember to ask them during your visits  © Copyright [employer] 2022 Information is for End User's use only and may not be sold, redistributed or otherwise used for commercial purposes  All illustrations and images included in CareNotes® are the copyrighted property of [employer]  or [facility] Christa Dunn   The above information is an  only  It is not intended as medical advice for individual conditions or treatments  Talk to your doctor, nurse or pharmacist before following any medical regimen to see if it is safe and effective for you

## 2022-09-15 ENCOUNTER — OFFICE VISIT (OUTPATIENT)
Dept: FAMILY MEDICINE CLINIC | Facility: CLINIC | Age: 47
End: 2022-09-15
Payer: COMMERCIAL

## 2022-09-15 VITALS
BODY MASS INDEX: 26.84 KG/M2 | TEMPERATURE: 98.1 F | WEIGHT: 157.2 LBS | SYSTOLIC BLOOD PRESSURE: 118 MMHG | OXYGEN SATURATION: 99 % | HEART RATE: 66 BPM | DIASTOLIC BLOOD PRESSURE: 80 MMHG | HEIGHT: 64 IN

## 2022-09-15 DIAGNOSIS — Z12.31 ENCOUNTER FOR SCREENING MAMMOGRAM FOR MALIGNANT NEOPLASM OF BREAST: ICD-10-CM

## 2022-09-15 DIAGNOSIS — Z11.4 SCREENING FOR HIV (HUMAN IMMUNODEFICIENCY VIRUS): ICD-10-CM

## 2022-09-15 DIAGNOSIS — Z12.11 SCREENING FOR COLON CANCER: ICD-10-CM

## 2022-09-15 DIAGNOSIS — G96.811 SPONTANEOUS INTRACRANIAL HYPOTENSION: ICD-10-CM

## 2022-09-15 DIAGNOSIS — Z00.00 ANNUAL PHYSICAL EXAM: Primary | ICD-10-CM

## 2022-09-15 DIAGNOSIS — Z11.59 ENCOUNTER FOR HEPATITIS C SCREENING TEST FOR LOW RISK PATIENT: ICD-10-CM

## 2022-09-15 PROBLEM — H69.92 EUSTACHIAN TUBE DISORDER, LEFT: Status: RESOLVED | Noted: 2021-04-08 | Resolved: 2022-09-15

## 2022-09-15 PROBLEM — Z32.01 POSITIVE PREGNANCY TEST: Status: RESOLVED | Noted: 2022-02-10 | Resolved: 2022-09-15

## 2022-09-15 PROCEDURE — 99396 PREV VISIT EST AGE 40-64: CPT | Performed by: NURSE PRACTITIONER

## 2022-09-15 NOTE — PATIENT INSTRUCTIONS
Wellness Visit for Adults   AMBULATORY CARE:   A wellness visit  is when you see your healthcare provider to get screened for health problems  Your healthcare provider will also give you advice on how to stay healthy  Write down your questions so you remember to ask them  Ask your healthcare provider how often you should have a wellness visit  What happens at a wellness visit:  Your healthcare provider will ask about your health, and your family history of health problems  This includes high blood pressure, heart disease, and cancer  He or she will ask if you have symptoms that concern you, if you smoke, and about your mood  You may also be asked about your intake of medicines, supplements, food, and alcohol  Any of the following may be done:  · Your weight  will be checked  Your height may also be checked so your body mass index (BMI) can be calculated  Your BMI shows if you are at a healthy weight  · Your blood pressure  and heart rate will be checked  Your temperature may also be checked  · Blood and urine tests  may be done  Blood tests may be done to check your cholesterol levels  Abnormal cholesterol levels increase your risk for heart disease and stroke  You may also need a blood or urine test to check for diabetes if you are at increased risk  Urine tests may be done to look for signs of an infection or kidney disease  · A physical exam  includes checking your heartbeat and lungs with a stethoscope  Your healthcare provider may also check your skin to look for sun damage  · Screening tests  may be recommended  A screening test is done to check for diseases that may not cause symptoms  The screening tests you may need depend on your age, gender, family history, and lifestyle habits  For example, colorectal screening may be recommended if you are 48years old or older  Screening tests you need if you are a woman:   · A Pap smear  is used to screen for cervical cancer   Pap smears are usually done every 3 to 5 years depending on your age  You may need them more often if you have had abnormal Pap smear test results in the past  Ask your healthcare provider how often you should have a Pap smear  · A mammogram  is an x-ray of your breasts to screen for breast cancer  Experts recommend mammograms every 2 years starting at age 48 years  You may need a mammogram at age 52 years or younger if you have an increased risk for breast cancer  Talk to your healthcare provider about when you should start having mammograms and how often you need them  Vaccines you may need:   · Get an influenza vaccine  every year  The influenza vaccine protects you from the flu  Several types of viruses cause the flu  The viruses change over time, so new vaccines are made each year  · Get a tetanus-diphtheria (Td) booster vaccine  every 10 years  This vaccine protects you against tetanus and diphtheria  Tetanus is a severe infection that may cause painful muscle spasms and lockjaw  Diphtheria is a severe bacterial infection that causes a thick covering in the back of your mouth and throat  · Get a human papillomavirus (HPV) vaccine  if you are female and aged 23 to 32 or male 23 to 24 and never received it  This vaccine protects you from HPV infection  HPV is the most common infection spread by sexual contact  HPV may also cause vaginal, penile, and anal cancers  · Get a pneumococcal vaccine  if you are aged 72 years or older  The pneumococcal vaccine is an injection given to protect you from pneumococcal disease  Pneumococcal disease is an infection caused by pneumococcal bacteria  The infection may cause pneumonia, meningitis, or an ear infection  · Get a shingles vaccine  if you are 60 or older, even if you have had shingles before  The shingles vaccine is an injection to protect you from the varicella-zoster virus  This is the same virus that causes chickenpox   Shingles is a painful rash that develops in people who had chickenpox or have been exposed to the virus  How to eat healthy:  My Plate is a model for planning healthy meals  It shows the types and amounts of foods that should go on your plate  Fruits and vegetables make up about half of your plate, and grains and protein make up the other half  A serving of dairy is included on the side of your plate  The amount of calories and serving sizes you need depends on your age, gender, weight, and height  Examples of healthy foods are listed below:  · Eat a variety of vegetables  such as dark green, red, and orange vegetables  You can also include canned vegetables low in sodium (salt) and frozen vegetables without added butter or sauces  · Eat a variety of fresh fruits , canned fruit in 100% juice, frozen fruit, and dried fruit  · Include whole grains  At least half of the grains you eat should be whole grains  Examples include whole-wheat bread, wheat pasta, brown rice, and whole-grain cereals such as oatmeal     · Eat a variety of protein foods such as seafood (fish and shellfish), lean meat, and poultry without skin (turkey and chicken)  Examples of lean meats include pork leg, shoulder, or tenderloin, and beef round, sirloin, tenderloin, and extra lean ground beef  Other protein foods include eggs and egg substitutes, beans, peas, soy products, nuts, and seeds  · Choose low-fat dairy products such as skim or 1% milk or low-fat yogurt, cheese, and cottage cheese  · Limit unhealthy fats  such as butter, hard margarine, and shortening  Exercise:  Exercise at least 30 minutes per day on most days of the week  Some examples of exercise include walking, biking, dancing, and swimming  You can also fit in more physical activity by taking the stairs instead of the elevator or parking farther away from stores  Include muscle strengthening activities 2 days each week  Regular exercise provides many health benefits   It helps you manage your weight, and decreases your risk for type 2 diabetes, heart disease, stroke, and high blood pressure  Exercise can also help improve your mood  Ask your healthcare provider about the best exercise plan for you  General health and safety guidelines:   · Do not smoke  Nicotine and other chemicals in cigarettes and cigars can cause lung damage  Ask your healthcare provider for information if you currently smoke and need help to quit  E-cigarettes or smokeless tobacco still contain nicotine  Talk to your healthcare provider before you use these products  · Limit alcohol  A drink of alcohol is 12 ounces of beer, 5 ounces of wine, or 1½ ounces of liquor  · Lose weight, if needed  Being overweight increases your risk of certain health conditions  These include heart disease, high blood pressure, type 2 diabetes, and certain types of cancer  · Protect your skin  Do not sunbathe or use tanning beds  Use sunscreen with a SPF 15 or higher  Apply sunscreen at least 15 minutes before you go outside  Reapply sunscreen every 2 hours  Wear protective clothing, hats, and sunglasses when you are outside  · Drive safely  Always wear your seatbelt  Make sure everyone in your car wears a seatbelt  A seatbelt can save your life if you are in an accident  Do not use your cell phone when you are driving  This could distract you and cause an accident  Pull over if you need to make a call or send a text message  · Practice safe sex  Use latex condoms if are sexually active and have more than one partner  Your healthcare provider may recommend screening tests for sexually transmitted infections (STIs)  · Wear helmets, lifejackets, and protective gear  Always wear a helmet when you ride a bike or motorcycle, go skiing, or play sports that could cause a head injury  Wear protective equipment when you play sports  Wear a lifejacket when you are on a boat or doing water sports      © Copyright [address] 2022 Information is for End User's use only and may not be sold, redistributed or otherwise used for commercial purposes  All illustrations and images included in CareNotes® are the copyrighted property of [employer]  or Damaso Edwards  The above information is an  only  It is not intended as medical advice for individual conditions or treatments  Talk to your doctor, nurse or pharmacist before following any medical regimen to see if it is safe and effective for you  Heart Healthy Diet   AMBULATORY CARE:   A heart healthy diet  is an eating plan low in unhealthy fats and sodium (salt)  The plan is high in healthy fats and fiber  A heart healthy diet helps improve your cholesterol levels and lowers your risk for heart disease and stroke  A dietitian will teach you how to read and understand food labels  Heart healthy diet guidelines to follow:   · Choose foods that contain healthy fats  ? Unsaturated fats  include monounsaturated and polyunsaturated fats  Unsaturated fat is found in foods such as soybean, canola, olive, corn, and safflower oils  It is also found in soft tub margarine that is made with liquid vegetable oil  ? Omega-3 fat  is found in certain fish, such as salmon, tuna, and trout, and in walnuts and flaxseed  Eat fish high in omega-3 fats at least 2 times a week  · Get 20 to 30 grams of fiber each day  Fruits, vegetables, whole-grain foods, and legumes (cooked beans) are good sources of fiber  · Limit or do not have unhealthy fats  ? Cholesterol  is found in animal foods, such as eggs and lobster, and in dairy products made from whole milk  Limit cholesterol to less than 200 mg each day  ? Saturated fat  is found in meats, such as pate and hamburger  It is also found in chicken or turkey skin, whole milk, and butter  Limit saturated fat to less than 7% of your total daily calories  ? Trans fat  is found in packaged foods, such as potato chips and cookies   It is also in hard margarine, some fried foods, and shortening  Do not eat foods that contain trans fats  · Limit sodium as directed  You may be told to limit sodium to 2,000 to 2,300 mg each day  Choose low-sodium or no-salt-added foods  Add little or no salt to food you prepare  Use herbs and spices in place of salt  Include the following in your heart healthy plan:  Ask your dietitian or healthcare provider how many servings to have from each of the following food groups:  · Grains:      ? Whole-wheat breads, cereals, and pastas, and brown rice    ? Low-fat, low-sodium crackers and chips    · Vegetables:      ? Broccoli, green beans, green peas, and spinach    ? Collards, kale, and lima beans    ? Carrots, sweet potatoes, tomatoes, and peppers    ? Canned vegetables with no salt added    · Fruits:      ? Bananas, peaches, pears, and pineapple    ? Grapes, raisins, and dates    ? Oranges, tangerines, grapefruit, orange juice, and grapefruit juice    ? Apricots, mangoes, melons, and papaya    ? Raspberries and strawberries    ? Canned fruit with no added sugar    · Low-fat dairy:      ? Nonfat (skim) milk, 1% milk, and low-fat almond, cashew, or soy milks fortified with calcium    ? Low-fat cheese, regular or frozen yogurt, and cottage cheese    · Meats and proteins:      ? Lean cuts of beef and pork (loin, leg, round), skinless chicken and turkey    ? Legumes, soy products, egg whites, or nuts    Limit or do not include the following in your heart healthy plan:   · Unhealthy fats and oils:      ? Whole or 2% milk, cream cheese, sour cream, or cheese    ? High-fat cuts of beef (T-bone steaks, ribs), chicken or turkey with skin, and organ meats such as liver    ?  Butter, stick margarine, shortening, and cooking oils such as coconut or palm oil    · Foods and liquids high in sodium:      ? Packaged foods, such as frozen dinners, cookies, macaroni and cheese, and cereals with more than 300 mg of sodium per serving    ? Vegetables with added sodium, such as instant potatoes, vegetables with added sauces, or regular canned vegetables    ? Cured or smoked meats, such as hot dogs, pate, and sausage    ? High-sodium ketchup, barbecue sauce, salad dressing, pickles, olives, soy sauce, or miso    · Foods and liquids high in sugar:      ? Candy, cake, cookies, pies, or doughnuts    ? Soft drinks (soda), sports drinks, or sweetened tea    ? Canned or dry mixes for cakes, soups, sauces, or gravies    Other healthy heart guidelines:   · Do not smoke  Nicotine and other chemicals in cigarettes and cigars can cause lung and heart damage  Ask your healthcare provider for information if you currently smoke and need help to quit  E-cigarettes or smokeless tobacco still contain nicotine  Talk to your healthcare provider before you use these products  · Limit or do not drink alcohol as directed  Alcohol can damage your heart and raise your blood pressure  Your healthcare provider may give you specific daily and weekly limits  The general recommended limit is 1 drink a day for women 21 or older and for men 72 or older  Do not have more than 3 drinks in a day or 7 in a week  The recommended limit is 2 drinks a day for men 24to 59years of age  Do not have more than 4 drinks in a day or 14 in a week  A drink of alcohol is 12 ounces of beer, 5 ounces of wine, or 1½ ounces of liquor  · Exercise regularly  Exercise can help you maintain a healthy weight and improve your blood pressure and cholesterol levels  Regular exercise can also decrease your risk for heart problems  Ask your healthcare provider about the best exercise plan for you  Do not start an exercise program without asking your healthcare provider  Follow up with your doctor or cardiologist as directed:  Write down your questions so you remember to ask them during your visits    © Copyright [employer] 2022 Information is for End User's use only and may not be sold, redistributed or otherwise used for commercial purposes  All illustrations and images included in CareNotes® are the copyrighted property of [employer]  or Damaso Edwards  The above information is an  only  It is not intended as medical advice for individual conditions or treatments  Talk to your doctor, nurse or pharmacist before following any medical regimen to see if it is safe and effective for you

## 2022-09-15 NOTE — PROGRESS NOTES
[address]    NAME: Shannon Beck  AGE: 42 y o  SEX: female  : 1975     DATE: 9/15/2022     Assessment and Plan:     Problem List Items Addressed This Visit        Nervous and Auditory    Spontaneous intracranial hypotension     To follow-up with neurosurgery for any return of symptoms  Other    Annual physical exam - Primary    Relevant Orders    Ambulatory referral to Gynecology      Other Visit Diagnoses     Encounter for screening mammogram for malignant neoplasm of breast        Relevant Orders    Mammo screening bilateral w 3d & cad    Screening for HIV (human immunodeficiency virus)        Relevant Orders    HIV 1/2 Antigen/Antibody (4th Generation) w Reflex [facility]    Encounter for hepatitis C screening test for low risk patient        Relevant Orders    Hepatitis C antibody    BMI 26 0-26 9,adult        To obtian labs, will call with results  Relevant Orders    CBC and differential    Comprehensive metabolic panel    Lipid Panel with Direct LDL reflex    TSH, 3rd generation with Free T4 reflex    Screening for colon cancer        Relevant Orders    Occult Blood, Fecal Immunochemical        Declined influenza immunization  Immunizations and preventive care screenings were discussed with patient today  Appropriate education was printed on patient's after visit summary  Counseling:  Alcohol/drug use: discussed moderation in alcohol intake, the recommendations for healthy alcohol use, and avoidance of illicit drug use  Dental Health: discussed importance of regular tooth brushing, flossing, and dental visits  Injury prevention: discussed safety/seat belts, safety helmets, smoke detectors, carbon dioxide detectors, and smoking near bedding or upholstery    Sexual health: discussed sexually transmitted diseases, partner selection, use of condoms, avoidance of unintended pregnancy, and contraceptive alternatives  · Exercise: the importance of regular exercise/physical activity was discussed  Recommend exercise 3-5 times per week for at least 30 minutes  Return in about 1 year (around 9/15/2023), or if symptoms worsen or fail to improve, for Annual physical      Chief Complaint:     Chief Complaint   Patient presents with    Physical Exam      History of Present Illness:     Adult Annual Physical   Patient here for a comprehensive physical exam  The patient reports left ear pressure  Has a history of intracranial hypotension  Symptoms began to improve today  Diet and Physical Activity  · Diet/Nutrition: well balanced diet  · Exercise: moderate cardiovascular exercise, vigorous cardiovascular exercise and 3-4 times a week on average  Depression Screening  PHQ-2/9 Depression Screening         General Health  · Sleep: gets 7-8 hours of sleep on average  · Hearing: decreased - left  Right side WNL  · Vision: most recent eye exam <1 year ago and wears glasses  · Dental: regular dental visits  /GYN Health  · Patient is: premenopausal  · Last menstrual period: 8/27/22  · Contraceptive method:      Review of Systems:     Review of Systems   Constitutional: Negative  HENT: Negative  Eyes: Negative  Respiratory: Negative  Cardiovascular: Negative  Gastrointestinal: Negative  Endocrine: Negative  Genitourinary: Negative  Musculoskeletal: Negative  Skin: Negative  Allergic/Immunologic: Negative  Neurological: Negative  Hematological: Negative  Psychiatric/Behavioral: Negative         Past Medical History:     Past Medical History:   Diagnosis Date    Pneumothorax on right 2015      Past Surgical History:     Past Surgical History:   Procedure Laterality Date    DILATION AND CURETTAGE OF UTERUS      IR SPINE PROCEDURE  6/16/2021    MYOMECTOMY        Social History:     Social History     Socioeconomic History    Marital status: /Civil Union     Spouse name: None    Number of children: None    Years of education: None    Highest education level: None   Occupational History    None   Tobacco Use    Smoking status: Never Smoker    Smokeless tobacco: Never Used   Vaping Use    Vaping Use: Never used   Substance and Sexual Activity    Alcohol use: Never    Drug use: Never    Sexual activity: None   Other Topics Concern    None   Social History Narrative    None     Social Determinants of Health     Financial Resource Strain: Not on file   Food Insecurity: Not on file   Transportation Needs: Not on file   Physical Activity: Not on file   Stress: Not on file   Social Connections: Not on file   Intimate Partner Violence: Not on file   Housing Stability: Not on file      Family History:     Family History   Problem Relation Age of Onset    Stomach cancer Brother     Migraines Mother     Cirrhosis Father     No Known Problems Sister     No Known Problems Sister     No Known Problems Brother       Current Medications:     Current Outpatient Medications   Medication Sig Dispense Refill    neomycin-polymyxin-hydrocortisone (CORTISPORIN) otic solution Administer 4 drops into the left ear every 6 (six) hours 10 mL 0     No current facility-administered medications for this visit  Allergies:     No Known Allergies   Physical Exam:     /80 (BP Location: Left arm, Patient Position: Sitting, Cuff Size: Standard)   Pulse 66   Temp 98 1 °F (36 7 °C) (Tympanic)   Ht 5' 4" (1 626 m)   Wt 71 3 kg (157 lb 3 2 oz)   SpO2 99%   BMI 26 98 kg/m²     Physical Exam  Vitals and nursing note reviewed  Constitutional:       General: She is not in acute distress  Appearance: Normal appearance  She is well-developed and normal weight  She is not ill-appearing, toxic-appearing or diaphoretic  HENT:      Head: Normocephalic and atraumatic        Right Ear: Tympanic membrane, ear canal and external ear normal  Left Ear: Tympanic membrane, ear canal and external ear normal       Nose: Nose normal       Mouth/Throat:      Mouth: Mucous membranes are dry  Pharynx: Oropharynx is clear  No oropharyngeal exudate  Eyes:      Extraocular Movements: Extraocular movements intact  Conjunctiva/sclera: Conjunctivae normal       Pupils: Pupils are equal, round, and reactive to light  Neck:      Thyroid: No thyromegaly  Cardiovascular:      Rate and Rhythm: Normal rate and regular rhythm  Heart sounds: Normal heart sounds  No murmur heard  Pulmonary:      Effort: Pulmonary effort is normal  No respiratory distress  Breath sounds: Normal breath sounds  No stridor  No wheezing or rales  Chest:      Chest wall: No tenderness  Abdominal:      General: Abdomen is flat  Bowel sounds are normal  There is no distension  Palpations: Abdomen is soft  There is no mass  Tenderness: There is no abdominal tenderness  There is no guarding or rebound  Hernia: No hernia is present  Musculoskeletal:         General: Normal range of motion  Cervical back: Normal range of motion and neck supple  Lymphadenopathy:      Cervical: No cervical adenopathy  Skin:     Capillary Refill: Capillary refill takes less than 2 seconds  Neurological:      General: No focal deficit present  Mental Status: She is alert and oriented to person, place, and time  Mental status is at baseline  Cranial Nerves: No cranial nerve deficit  Psychiatric:         Mood and Affect: Mood normal          Behavior: Behavior normal          Thought Content: Thought content normal          Judgment: Judgment normal           JOSE Paris [address] [place] [address]  BMI Counseling: Body mass index is 26 98 kg/m²   The BMI is above normal  Nutrition recommendations include decreasing overall calorie intake, 3-5 servings of fruits/vegetables daily, reducing fast food intake, consuming healthier snacks, decreasing soda and/or juice intake, moderation in carbohydrate intake and increasing intake of lean protein  Exercise recommendations include moderate aerobic physical activity for 150 minutes/week

## 2022-09-22 ENCOUNTER — APPOINTMENT (OUTPATIENT)
Dept: LAB | Facility: CLINIC | Age: 47
End: 2022-09-22
Payer: COMMERCIAL

## 2022-09-22 ENCOUNTER — HOSPITAL ENCOUNTER (OUTPATIENT)
Dept: MAMMOGRAPHY | Facility: CLINIC | Age: 47
End: 2022-09-22
Payer: COMMERCIAL

## 2022-09-22 VITALS — HEIGHT: 64 IN | BODY MASS INDEX: 26.8 KG/M2 | WEIGHT: 157 LBS

## 2022-09-22 DIAGNOSIS — Z11.59 ENCOUNTER FOR HEPATITIS C SCREENING TEST FOR LOW RISK PATIENT: ICD-10-CM

## 2022-09-22 DIAGNOSIS — Z12.31 ENCOUNTER FOR SCREENING MAMMOGRAM FOR MALIGNANT NEOPLASM OF BREAST: ICD-10-CM

## 2022-09-22 DIAGNOSIS — Z11.4 SCREENING FOR HIV (HUMAN IMMUNODEFICIENCY VIRUS): ICD-10-CM

## 2022-09-22 DIAGNOSIS — Z32.01 POSITIVE PREGNANCY TEST: ICD-10-CM

## 2022-09-22 LAB
ALBUMIN SERPL BCP-MCNC: 3.6 G/DL (ref 3.5–5)
ALP SERPL-CCNC: 56 U/L (ref 46–116)
ALT SERPL W P-5'-P-CCNC: 20 U/L (ref 12–78)
ANION GAP SERPL CALCULATED.3IONS-SCNC: 5 MMOL/L (ref 4–13)
AST SERPL W P-5'-P-CCNC: 17 U/L (ref 5–45)
B-HCG SERPL-ACNC: <2 MIU/ML
BASOPHILS # BLD AUTO: 0.03 THOUSANDS/ΜL (ref 0–0.1)
BASOPHILS NFR BLD AUTO: 0 % (ref 0–1)
BILIRUB SERPL-MCNC: 0.49 MG/DL (ref 0.2–1)
BUN SERPL-MCNC: 15 MG/DL (ref 5–25)
CALCIUM SERPL-MCNC: 9.6 MG/DL (ref 8.3–10.1)
CHLORIDE SERPL-SCNC: 106 MMOL/L (ref 96–108)
CHOLEST SERPL-MCNC: 214 MG/DL
CO2 SERPL-SCNC: 25 MMOL/L (ref 21–32)
CREAT SERPL-MCNC: 0.81 MG/DL (ref 0.6–1.3)
EOSINOPHIL # BLD AUTO: 0.04 THOUSAND/ΜL (ref 0–0.61)
EOSINOPHIL NFR BLD AUTO: 1 % (ref 0–6)
ERYTHROCYTE [DISTWIDTH] IN BLOOD BY AUTOMATED COUNT: 13.1 % (ref 11.6–15.1)
GFR SERPL CREATININE-BSD FRML MDRD: 88 ML/MIN/1.73SQ M
GLUCOSE P FAST SERPL-MCNC: 85 MG/DL (ref 65–99)
HCT VFR BLD AUTO: 37.9 % (ref 34.8–46.1)
HDLC SERPL-MCNC: 85 MG/DL
HGB BLD-MCNC: 12.2 G/DL (ref 11.5–15.4)
IMM GRANULOCYTES # BLD AUTO: 0.02 THOUSAND/UL (ref 0–0.2)
IMM GRANULOCYTES NFR BLD AUTO: 0 % (ref 0–2)
LDLC SERPL CALC-MCNC: 119 MG/DL (ref 0–100)
LYMPHOCYTES # BLD AUTO: 2.62 THOUSANDS/ΜL (ref 0.6–4.47)
LYMPHOCYTES NFR BLD AUTO: 32 % (ref 14–44)
MCH RBC QN AUTO: 31 PG (ref 26.8–34.3)
MCHC RBC AUTO-ENTMCNC: 32.2 G/DL (ref 31.4–37.4)
MCV RBC AUTO: 96 FL (ref 82–98)
MONOCYTES # BLD AUTO: 0.67 THOUSAND/ΜL (ref 0.17–1.22)
MONOCYTES NFR BLD AUTO: 8 % (ref 4–12)
NEUTROPHILS # BLD AUTO: 4.84 THOUSANDS/ΜL (ref 1.85–7.62)
NEUTS SEG NFR BLD AUTO: 59 % (ref 43–75)
NRBC BLD AUTO-RTO: 0 /100 WBCS
PLATELET # BLD AUTO: 183 THOUSANDS/UL (ref 149–390)
PMV BLD AUTO: 11.2 FL (ref 8.9–12.7)
POTASSIUM SERPL-SCNC: 4.2 MMOL/L (ref 3.5–5.3)
PROT SERPL-MCNC: 8.2 G/DL (ref 6.4–8.4)
RBC # BLD AUTO: 3.93 MILLION/UL (ref 3.81–5.12)
SODIUM SERPL-SCNC: 136 MMOL/L (ref 135–147)
TRIGL SERPL-MCNC: 50 MG/DL
TSH SERPL DL<=0.05 MIU/L-ACNC: 0.75 UIU/ML (ref 0.45–4.5)
WBC # BLD AUTO: 8.22 THOUSAND/UL (ref 4.31–10.16)

## 2022-09-22 PROCEDURE — 87389 HIV-1 AG W/HIV-1&-2 AB AG IA: CPT

## 2022-09-22 PROCEDURE — 77063 BREAST TOMOSYNTHESIS BI: CPT

## 2022-09-22 PROCEDURE — 80061 LIPID PANEL: CPT

## 2022-09-22 PROCEDURE — 36415 COLL VENOUS BLD VENIPUNCTURE: CPT

## 2022-09-22 PROCEDURE — 86803 HEPATITIS C AB TEST: CPT

## 2022-09-22 PROCEDURE — 84702 CHORIONIC GONADOTROPIN TEST: CPT

## 2022-09-22 PROCEDURE — 77067 SCR MAMMO BI INCL CAD: CPT

## 2022-09-22 PROCEDURE — 80053 COMPREHEN METABOLIC PANEL: CPT

## 2022-09-22 PROCEDURE — 84443 ASSAY THYROID STIM HORMONE: CPT

## 2022-09-22 PROCEDURE — 85025 COMPLETE CBC W/AUTO DIFF WBC: CPT

## 2022-09-23 LAB
HCV AB SER QL: NORMAL
HIV 1+2 AB+HIV1 P24 AG SERPL QL IA: NORMAL

## 2022-10-04 ENCOUNTER — HOSPITAL ENCOUNTER (OUTPATIENT)
Dept: MAMMOGRAPHY | Facility: CLINIC | Age: 47
Discharge: HOME | End: 2022-10-04
Payer: COMMERCIAL

## 2022-10-04 DIAGNOSIS — R92.0 MAMMOGRAPHIC MICROCALCIFICATION: ICD-10-CM

## 2022-10-04 PROCEDURE — 77066 DX MAMMO INCL CAD BI: CPT

## 2022-10-06 DIAGNOSIS — R92.8 ABNORMAL MAMMOGRAM: Primary | ICD-10-CM

## 2023-02-09 ENCOUNTER — OFFICE VISIT (OUTPATIENT)
Dept: FAMILY MEDICINE CLINIC | Facility: CLINIC | Age: 48
End: 2023-02-09

## 2023-02-09 VITALS
WEIGHT: 156.2 LBS | TEMPERATURE: 96.8 F | HEART RATE: 84 BPM | BODY MASS INDEX: 26.67 KG/M2 | OXYGEN SATURATION: 98 % | HEIGHT: 64 IN | DIASTOLIC BLOOD PRESSURE: 68 MMHG | SYSTOLIC BLOOD PRESSURE: 108 MMHG

## 2023-02-09 DIAGNOSIS — H69.82 DYSFUNCTION OF LEFT EUSTACHIAN TUBE: Primary | ICD-10-CM

## 2023-02-09 PROBLEM — Z00.00 ANNUAL PHYSICAL EXAM: Status: RESOLVED | Noted: 2022-09-15 | Resolved: 2023-02-09

## 2023-02-09 PROBLEM — H69.92 DYSFUNCTION OF LEFT EUSTACHIAN TUBE: Status: ACTIVE | Noted: 2023-02-09

## 2023-02-09 RX ORDER — METHYLPREDNISOLONE 4 MG/1
TABLET ORAL
Qty: 21 EACH | Refills: 0 | Status: SHIPPED | OUTPATIENT
Start: 2023-02-09

## 2023-02-09 NOTE — PROGRESS NOTES
Assessment/Plan:    Dysfunction of left eustachian tube  To begin Medrol Dosepak  To follow-up if no improvement in 1 week or sooner if symptoms worsen  Diagnoses and all orders for this visit:    Dysfunction of left eustachian tube  -     methylPREDNISolone 4 MG tablet therapy pack; Use as directed on package          Subjective:      Patient ID: Curtis Lam is a 41 y o  female  Quillian Patch presents reporting left ear discomfort for the past several months  This past week she deveoped tinnitis and slightly dizzy after travling in the care 2 hours  For the past month she is also developed headaches  The pain will start in her right maxillary sinus and continue into her frontal lobe  She has a history of migraines as well as intracranial hypotension  She reports her headaches do not feel similar  Denies fever, change in vision, nausea, vomiting, paresthesias, or weakness in extremities  She was treated with eardrops in September without improvement  The following portions of the patient's history were reviewed and updated as appropriate: She   Patient Active Problem List    Diagnosis Date Noted   • Dysfunction of left eustachian tube 02/09/2023   • Spontaneous intracranial hypotension 06/07/2021   • Migraine without aura and without status migrainosus, not intractable 04/08/2021     Current Outpatient Medications   Medication Sig Dispense Refill   • methylPREDNISolone 4 MG tablet therapy pack Use as directed on package 21 each 0     No current facility-administered medications for this visit  She has No Known Allergies       Review of Systems   Constitutional: Negative  HENT: Positive for tinnitus  Eyes: Negative  Respiratory: Negative  Cardiovascular: Negative  Gastrointestinal: Negative  Musculoskeletal: Negative  Neurological: Positive for light-headedness and headaches  Psychiatric/Behavioral: Negative            /68 (BP Location: Left arm, Patient Position: Sitting, Cuff Size: Standard)   Pulse 84   Temp (!) 96 8 °F (36 °C) (Tympanic)   Ht 5' 4" (1 626 m)   Wt 70 9 kg (156 lb 3 2 oz)   SpO2 98%   BMI 26 81 kg/m²     Objective:     Physical Exam  Vitals and nursing note reviewed  Constitutional:       General: She is not in acute distress  Appearance: Normal appearance  She is well-developed  She is not ill-appearing, toxic-appearing or diaphoretic  HENT:      Head: Normocephalic and atraumatic  Ears:      Comments: Presence of fluid behind TMs, no erythema      Nose: Mucosal edema and congestion present  Right Sinus: No maxillary sinus tenderness or frontal sinus tenderness  Left Sinus: No maxillary sinus tenderness or frontal sinus tenderness  Mouth/Throat:      Pharynx: Oropharynx is clear  Eyes:      Conjunctiva/sclera: Conjunctivae normal    Cardiovascular:      Rate and Rhythm: Normal rate and regular rhythm  Heart sounds: Normal heart sounds  No murmur heard  Pulmonary:      Effort: Pulmonary effort is normal  No respiratory distress  Breath sounds: Normal breath sounds  No wheezing or rales  Chest:      Chest wall: No tenderness  Musculoskeletal:      Cervical back: Neck supple  Neurological:      General: No focal deficit present  Mental Status: She is alert and oriented to person, place, and time  Cranial Nerves: No cranial nerve deficit  Sensory: No sensory deficit  Motor: No weakness  Coordination: Coordination normal       Gait: Gait normal    Psychiatric:         Mood and Affect: Mood normal          Behavior: Behavior normal          Thought Content:  Thought content normal          Judgment: Judgment normal

## 2023-02-23 ENCOUNTER — ANNUAL EXAM (OUTPATIENT)
Dept: FAMILY MEDICINE CLINIC | Facility: CLINIC | Age: 48
End: 2023-02-23

## 2023-02-23 VITALS
BODY MASS INDEX: 26.91 KG/M2 | TEMPERATURE: 97.9 F | WEIGHT: 157.6 LBS | OXYGEN SATURATION: 98 % | DIASTOLIC BLOOD PRESSURE: 68 MMHG | SYSTOLIC BLOOD PRESSURE: 112 MMHG | HEART RATE: 70 BPM | HEIGHT: 64 IN

## 2023-02-23 DIAGNOSIS — Z12.4 ENCOUNTER FOR PAPANICOLAOU SMEAR OF CERVIX: Primary | ICD-10-CM

## 2023-02-23 DIAGNOSIS — H93.8X2 CLOGGED EAR, LEFT: ICD-10-CM

## 2023-02-23 DIAGNOSIS — Z12.4 SCREENING FOR CERVICAL CANCER: ICD-10-CM
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
## 2023-03-01 LAB
LAB AP GYN PRIMARY INTERPRETATION: NORMAL
Lab: NORMAL

## 2023-03-02 ENCOUNTER — HOSPITAL ENCOUNTER (EMERGENCY)
Facility: HOSPITAL | Age: 48
Discharge: HOME | End: 2023-03-02
Attending: EMERGENCY MEDICINE

## 2023-03-02 ENCOUNTER — APPOINTMENT (EMERGENCY)
Dept: CT IMAGING | Facility: HOSPITAL | Age: 48
End: 2023-03-02

## 2023-03-02 VITALS
TEMPERATURE: 99 F | OXYGEN SATURATION: 100 % | HEART RATE: 60 BPM | SYSTOLIC BLOOD PRESSURE: 116 MMHG | DIASTOLIC BLOOD PRESSURE: 80 MMHG | RESPIRATION RATE: 16 BRPM

## 2023-03-02 DIAGNOSIS — R42 DIZZINESS: Primary | ICD-10-CM

## 2023-03-02 LAB
ALBUMIN SERPL BCP-MCNC: 4.4 G/DL (ref 3.5–5)
ALP SERPL-CCNC: 49 U/L (ref 34–104)
ALT SERPL W P-5'-P-CCNC: 13 U/L (ref 7–52)
ANION GAP SERPL CALCULATED.3IONS-SCNC: 3 MMOL/L (ref 4–13)
AST SERPL W P-5'-P-CCNC: 17 U/L (ref 13–39)
ATRIAL RATE: 60 BPM
BACTERIA UR QL AUTO: NORMAL /HPF
BASOPHILS # BLD AUTO: 0.03 THOUSANDS/ÂΜL (ref 0–0.1)
BASOPHILS NFR BLD AUTO: 0 % (ref 0–1)
BILIRUB SERPL-MCNC: 0.44 MG/DL (ref 0.2–1)
BILIRUB UR QL STRIP: NEGATIVE
BUN SERPL-MCNC: 14 MG/DL (ref 5–25)
CALCIUM SERPL-MCNC: 9.7 MG/DL (ref 8.4–10.2)
CARDIAC TROPONIN I PNL SERPL HS: 4 NG/L
CHLORIDE SERPL-SCNC: 106 MMOL/L (ref 96–108)
CLARITY UR: CLEAR
CO2 SERPL-SCNC: 29 MMOL/L (ref 21–32)
COLOR UR: ABNORMAL
CREAT SERPL-MCNC: 0.73 MG/DL (ref 0.6–1.3)
EOSINOPHIL # BLD AUTO: 0.05 THOUSAND/ÂΜL (ref 0–0.61)
EOSINOPHIL NFR BLD AUTO: 1 % (ref 0–6)
ERYTHROCYTE [DISTWIDTH] IN BLOOD BY AUTOMATED COUNT: 12.9 % (ref 11.6–15.1)
EXT PREGNANCY TEST URINE: NEGATIVE
EXT. CONTROL: NORMAL
GFR SERPL CREATININE-BSD FRML MDRD: 99 ML/MIN/1.73SQ M
GLUCOSE SERPL-MCNC: 90 MG/DL (ref 65–140)
GLUCOSE UR STRIP-MCNC: NEGATIVE MG/DL
HCT VFR BLD AUTO: 38.3 % (ref 34.8–46.1)
HGB BLD-MCNC: 12.3 G/DL (ref 11.5–15.4)
HGB UR QL STRIP.AUTO: ABNORMAL
IMM GRANULOCYTES # BLD AUTO: 0.03 THOUSAND/UL (ref 0–0.2)
IMM GRANULOCYTES NFR BLD AUTO: 0 % (ref 0–2)
KETONES UR STRIP-MCNC: NEGATIVE MG/DL
LEUKOCYTE ESTERASE UR QL STRIP: NEGATIVE
LYMPHOCYTES # BLD AUTO: 2.1 THOUSANDS/ÂΜL (ref 0.6–4.47)
LYMPHOCYTES NFR BLD AUTO: 29 % (ref 14–44)
MCH RBC QN AUTO: 31 PG (ref 26.8–34.3)
MCHC RBC AUTO-ENTMCNC: 32.1 G/DL (ref 31.4–37.4)
MCV RBC AUTO: 97 FL (ref 82–98)
MONOCYTES # BLD AUTO: 0.57 THOUSAND/ÂΜL (ref 0.17–1.22)
MONOCYTES NFR BLD AUTO: 8 % (ref 4–12)
NEUTROPHILS # BLD AUTO: 4.43 THOUSANDS/ÂΜL (ref 1.85–7.62)
NEUTS SEG NFR BLD AUTO: 62 % (ref 43–75)
NITRITE UR QL STRIP: NEGATIVE
NON-SQ EPI CELLS URNS QL MICRO: NORMAL /HPF
NRBC BLD AUTO-RTO: 0 /100 WBCS
P AXIS: 47 DEGREES
PH UR STRIP.AUTO: 7 [PH]
PLATELET # BLD AUTO: 215 THOUSANDS/UL (ref 149–390)
PMV BLD AUTO: 10 FL (ref 8.9–12.7)
POTASSIUM SERPL-SCNC: 4.2 MMOL/L (ref 3.5–5.3)
PR INTERVAL: 142 MS
PROT SERPL-MCNC: 8.2 G/DL (ref 6.4–8.4)
PROT UR STRIP-MCNC: NEGATIVE MG/DL
QRS AXIS: 83 DEGREES
QRSD INTERVAL: 86 MS
QT INTERVAL: 394 MS
QTC INTERVAL: 394 MS
RBC # BLD AUTO: 3.97 MILLION/UL (ref 3.81–5.12)
RBC #/AREA URNS AUTO: NORMAL /HPF
SODIUM SERPL-SCNC: 138 MMOL/L (ref 135–147)
SP GR UR STRIP.AUTO: 1.01 (ref 1–1.03)
T WAVE AXIS: 49 DEGREES
UROBILINOGEN UR STRIP-ACNC: <2 MG/DL
VENTRICULAR RATE: 60 BPM
WBC # BLD AUTO: 7.21 THOUSAND/UL (ref 4.31–10.16)
WBC #/AREA URNS AUTO: NORMAL /HPF

## 2023-03-02 RX ORDER — MECLIZINE HYDROCHLORIDE 25 MG/1
25 TABLET ORAL ONCE
Status: COMPLETED | OUTPATIENT
Start: 2023-03-02 | End: 2023-03-02

## 2023-03-02 RX ORDER — MECLIZINE HYDROCHLORIDE 25 MG/1
25 TABLET ORAL 3 TIMES DAILY PRN
Qty: 30 TABLET | Refills: 0 | Status: SHIPPED | OUTPATIENT
Start: 2023-03-02

## 2023-03-02 RX ADMIN — SODIUM CHLORIDE 1000 ML: 0.9 INJECTION, SOLUTION INTRAVENOUS at 10:42

## 2023-03-02 RX ADMIN — MECLIZINE HYDROCHLORIDE 25 MG: 25 TABLET ORAL at 11:22

## 2023-03-02 NOTE — Clinical Note
Jailyn Billy was seen and treated in our emergency department on 3/2/2023  Diagnosis:     Kayleigh Azul  may return to work on return date  She may return on this date: 03/06/2023         If you have any questions or concerns, please don't hesitate to call        Sylvia Handley MD    ______________________________           _______________          _______________  Hospital Representative                              Date                                Time

## 2023-03-02 NOTE — ED PROVIDER NOTES
History  Chief Complaint   Patient presents with   • Earache     Pt reports clogged ear, ringing of the ears, and off balance     HPI    None       Past Medical History:   Diagnosis Date   • CSF leak 2021   • Pneumothorax on right 2015       Past Surgical History:   Procedure Laterality Date   • DILATION AND CURETTAGE OF UTERUS     • IR SPINE PROCEDURE  6/16/2021   • MYOMECTOMY         Family History   Problem Relation Age of Onset   • Migraines Mother    • Cirrhosis Father    • No Known Problems Sister    • No Known Problems Sister    • Stomach cancer Brother    • No Known Problems Brother      I have reviewed and agree with the history as documented      E-Cigarette/Vaping   • E-Cigarette Use Never User      E-Cigarette/Vaping Substances   • Nicotine No    • THC No    • CBD No    • Flavoring No    • Other No    • Unknown No      Social History     Tobacco Use   • Smoking status: Never   • Smokeless tobacco: Never   Vaping Use   • Vaping Use: Never used   Substance Use Topics   • Alcohol use: Never   • Drug use: Never       Review of Systems    Physical Exam  Physical Exam    Vital Signs  ED Triage Vitals [03/02/23 0950]   Temperature Pulse Respirations Blood Pressure SpO2   99 °F (37 2 °C) 69 18 116/83 100 %      Temp Source Heart Rate Source Patient Position - Orthostatic VS BP Location FiO2 (%)   Oral Monitor Sitting Right arm --      Pain Score       --           Vitals:    03/02/23 0950   BP: 116/83   Pulse: 69   Patient Position - Orthostatic VS: Sitting         Visual Acuity      ED Medications  Medications   sodium chloride 0 9 % bolus 1,000 mL (1,000 mL Intravenous New Bag 3/2/23 1042)   meclizine (ANTIVERT) tablet 25 mg (25 mg Oral Given 3/2/23 1122)       Diagnostic Studies  Results Reviewed     Procedure Component Value Units Date/Time    HS Troponin I 4hr [[id number]]     Lab Status: No result Specimen: Blood     Urine Microscopic [[id number]]  (Normal) Collected: 03/02/23 1112    Lab Status: Final result Specimen: Urine, Clean Catch Updated: 03/02/23 1125     RBC, UA 1-2 /hpf      WBC, UA None Seen /hpf      Epithelial Cells Occasional /hpf      Bacteria, UA None Seen /hpf     UA (URINE) with reflex to Scope [[id number]]  (Abnormal) Collected: 03/02/23 1112    Lab Status: Final result Specimen: Urine, Clean Catch Updated: 03/02/23 1123     Color, UA Light Yellow     Clarity, UA Clear     Specific Gravity, UA 1 013     pH, UA 7 0     Leukocytes, UA Negative     Nitrite, UA Negative     Protein, UA Negative mg/dl      Glucose, UA Negative mg/dl      Ketones, UA Negative mg/dl      Urobilinogen, UA <2 0 mg/dl      Bilirubin, UA Negative     Occult Blood, UA Moderate    HS Troponin 0hr (reflex protocol) [[id number]]  (Normal) Collected: 03/02/23 1042    Lab Status: Final result Specimen: Blood from Arm, Left Updated: 03/02/23 1120     hs TnI 0hr 4 ng/L     HS Troponin I 2hr [[id number]]     Lab Status: No result Specimen: Blood     POCT pregnancy, urine [[id number]]  (Normal) Resulted: 03/02/23 1116    Lab Status: Final result Updated: 03/02/23 1116     EXT Preg Test, Ur Negative     Control Valid    Comprehensive metabolic panel [[id number]]  (Abnormal) Collected: 03/02/23 1042    Lab Status: Final result Specimen: Blood from Arm, Left Updated: 03/02/23 1111     Sodium 138 mmol/L      Potassium 4 2 mmol/L      Chloride 106 mmol/L      CO2 29 mmol/L      ANION GAP 3 mmol/L      BUN 14 mg/dL      Creatinine 0 73 mg/dL      Glucose 90 mg/dL      Calcium 9 7 mg/dL      AST 17 U/L      ALT 13 U/L      Alkaline Phosphatase 49 U/L      Total Protein 8 2 g/dL      Albumin 4 4 g/dL      Total Bilirubin 0 44 mg/dL      eGFR 99 ml/min/1 73sq m     Narrative:      Papa guidelines for Chronic Kidney Disease (CKD):   •  Stage 1 with normal or high GFR (GFR > 90 mL/min/1 73 square meters)  •  Stage 2 Mild CKD (GFR = 60-89 mL/min/1 73 square meters)  •  Stage 3A Moderate CKD (GFR = 45-59 mL/min/1 73 square meters)  •  Stage 3B Moderate CKD (GFR = 30-44 mL/min/1 73 square meters)  •  Stage 4 Severe CKD (GFR = 15-29 mL/min/1 73 square meters)  •  Stage 5 End Stage CKD (GFR <15 mL/min/1 73 square meters)  Note: GFR calculation is accurate only with a steady state creatinine    CBC and differential [[id number]] Collected: 03/02/23 1042    Lab Status: Final result Specimen: Blood from Arm, Left Updated: 03/02/23 1051     WBC 7 21 Thousand/uL      RBC 3 97 Million/uL      Hemoglobin 12 3 g/dL      Hematocrit 38 3 %      MCV 97 fL      MCH 31 0 pg      MCHC 32 1 g/dL      RDW 12 9 %      MPV 10 0 fL      Platelets 251 Thousands/uL      nRBC 0 /100 WBCs      Neutrophils Relative 62 %      Immat GRANS % 0 %      Lymphocytes Relative 29 %      Monocytes Relative 8 %      Eosinophils Relative 1 %      Basophils Relative 0 %      Neutrophils Absolute 4 43 Thousands/µL      Immature Grans Absolute 0 03 Thousand/uL      Lymphocytes Absolute 2 10 Thousands/µL      Monocytes Absolute 0 57 Thousand/µL      Eosinophils Absolute 0 05 Thousand/µL      Basophils Absolute 0 03 Thousands/µL                  CT head without contrast   Final Result by Marcelino Patel MD (03/02 1057)      No acute intracranial abnormality  Workstation performed: [id number]                    Procedures  Procedures         ED Course                               SBIRT 20yo+    Flowsheet Row Most Recent Value   SBIRT (25 yo +)    In order to provide better care to our patients, we are screening all of our patients for alcohol and drug use  Would it be okay to ask you these screening questions? Yes Filed at: 03/02/2023 5906   Initial Alcohol Screen: US AUDIT-C     1  How often do you have a drink containing alcohol? 0 Filed at: 03/02/2023 0955   2  How many drinks containing alcohol do you have on a typical day you are drinking? 0 Filed at: 03/02/2023 0955   3a  Male UNDER 65: How often do you have five or more drinks on one occasion? 0 Filed at: 03/02/2023 0955   3b  FEMALE Any Age, or MALE 65+: How often do you have 4 or more drinks on one occassion? 0 Filed at: 03/02/2023 0955   Audit-C Score 0 Filed at: 03/02/2023 0749   WENDY: How many times in the past year have you    Used an illegal drug or used a prescription medication for non-medical reasons? Never Filed at: 03/02/2023 1343                    MDM    Disposition  Final diagnoses:   None     ED Disposition     None      Follow-up Information    None         Patient's Medications    No medications on file       No discharge procedures on file      PDMP Review     None          ED Provider  Electronically Signed by past year have you    Used an illegal drug or used a prescription medication for non-medical reasons? Never Filed at: 03/02/2023 6125                    Medical Decision Making  Dizziness: acute illness or injury  Amount and/or Complexity of Data Reviewed  Labs: ordered  Decision-making details documented in ED Course  Radiology: ordered  Decision-making details documented in ED Course  Disposition  Final diagnoses:   Dizziness     Time reflects when diagnosis was documented in both MDM as applicable and the Disposition within this note     Time User Action Codes Description Comment    3/2/2023 12:40 PM Aleksey Balding Add [H93 19] Tinnitus     3/2/2023 12:40 PM Aleksey Balding Remove [H93 19] Tinnitus     3/2/2023 12:40 PM Aleksey Balding Add [R42] Dizziness       ED Disposition     ED Disposition   Discharge    Condition   Stable    Date/Time   Thu Mar 2, 2023 12:40 PM    Comment   Lucrecia Salazar discharge to home/self care                 Follow-up Information     Follow up With Specialties Details Why Contact Info    Shalom Daniels, [address], Nurse Practitioner   [phone] [address] [place] [address]      Maryellen Cedillo MD Otolaryngology   [facility] [place] [address] [place] [address]  [phone]            Discharge Medication List as of 3/2/2023 12:44 PM      START taking these medications    Details   meclizine (ANTIVERT) 25 mg tablet Take 1 tablet (25 mg total) by mouth 3 (three) times a day as needed for dizziness, Starting Thu 3/2/2023, Normal                 PDMP Review     None          ED Provider  Electronically Signed by           Rojelio Da Silva MD  03/22/23 2114

## 2023-04-24 ENCOUNTER — HOSPITAL ENCOUNTER (OUTPATIENT)
Dept: MAMMOGRAPHY | Facility: CLINIC | Age: 48
Discharge: HOME | End: 2023-04-24

## 2023-04-24 VITALS — WEIGHT: 157 LBS | HEIGHT: 64 IN | BODY MASS INDEX: 26.8 KG/M2

## 2023-04-24 DIAGNOSIS — R92.8 ABNORMAL MAMMOGRAM: ICD-10-CM

## 2023-10-23 ENCOUNTER — HOSPITAL ENCOUNTER (OUTPATIENT)
Dept: MAMMOGRAPHY | Facility: CLINIC | Age: 48
Discharge: HOME | End: 2023-10-23
Payer: COMMERCIAL

## 2023-10-23 VITALS — BODY MASS INDEX: 26.8 KG/M2 | WEIGHT: 157 LBS | HEIGHT: 64 IN

## 2023-10-23 DIAGNOSIS — R92.8 MAMMOGRAM ABNORMAL: ICD-10-CM

## 2023-10-23 PROCEDURE — G0279 TOMOSYNTHESIS, MAMMO: HCPCS

## 2023-10-23 PROCEDURE — 77066 DX MAMMO INCL CAD BI: CPT

## 2023-10-24 DIAGNOSIS — Z12.31 ENCOUNTER FOR SCREENING MAMMOGRAM FOR MALIGNANT NEOPLASM OF BREAST: ICD-10-CM

## 2023-10-24 DIAGNOSIS — R92.8 ABNORMAL MAMMOGRAM: Primary | ICD-10-CM

## 2023-12-17 ENCOUNTER — HOSPITAL ENCOUNTER (EMERGENCY)
Facility: HOSPITAL | Age: 48
Discharge: HOME | End: 2023-12-17
Attending: EMERGENCY MEDICINE | Admitting: EMERGENCY MEDICINE
Payer: COMMERCIAL

## 2023-12-17 ENCOUNTER — APPOINTMENT (EMERGENCY)
Dept: RADIOLOGY | Facility: HOSPITAL | Age: 48
End: 2023-12-17
Payer: COMMERCIAL

## 2023-12-17 VITALS
HEART RATE: 76 BPM | OXYGEN SATURATION: 100 % | DIASTOLIC BLOOD PRESSURE: 58 MMHG | SYSTOLIC BLOOD PRESSURE: 127 MMHG | TEMPERATURE: 98 F | RESPIRATION RATE: 17 BRPM

## 2023-12-17 DIAGNOSIS — M25.561 ACUTE PAIN OF RIGHT KNEE: Primary | ICD-10-CM

## 2023-12-17 PROCEDURE — 99283 EMERGENCY DEPT VISIT LOW MDM: CPT

## 2023-12-17 PROCEDURE — 99284 EMERGENCY DEPT VISIT MOD MDM: CPT | Performed by: PHYSICIAN ASSISTANT

## 2023-12-17 PROCEDURE — 73564 X-RAY EXAM KNEE 4 OR MORE: CPT

## 2023-12-17 RX ORDER — IBUPROFEN 600 MG/1
600 TABLET ORAL ONCE
Status: COMPLETED | OUTPATIENT
Start: 2023-12-17 | End: 2023-12-17

## 2023-12-17 RX ADMIN — IBUPROFEN 600 MG: 600 TABLET, FILM COATED ORAL at 09:35

## 2023-12-17 NOTE — ED PROVIDER NOTES
History  Chief Complaint   Patient presents with    Knee Pain     C/o right knee pain since thanksgiving, that is getting worse.      Patient is a 48-year-old female with no significant past medical history presented to the emergency department for evaluation of right knee pain.  Symptoms been ongoing and worsening over the last month.  She does not remember if she injured her knee.  Pain is worsened with weightbearing and ambulation.  Pain seems to be better with icing and rest.  Denying swelling.  No numbness or tingling.  No other injuries or complaints at this time.        Prior to Admission Medications   Prescriptions Last Dose Informant Patient Reported? Taking?   meclizine (ANTIVERT) 25 mg tablet   No No   Sig: Take 1 tablet (25 mg total) by mouth 3 (three) times a day as needed for dizziness      Facility-Administered Medications: None       Past Medical History:   Diagnosis Date    CSF leak 2021    Pneumothorax on right 2015       Past Surgical History:   Procedure Laterality Date    DILATION AND CURETTAGE OF UTERUS      IR SPINE PROCEDURE  6/16/2021    MYOMECTOMY         Family History   Problem Relation Age of Onset    Migraines Mother     Cirrhosis Father     No Known Problems Sister     No Known Problems Sister     Stomach cancer Brother     No Known Problems Brother      I have reviewed and agree with the history as documented.    E-Cigarette/Vaping    E-Cigarette Use Never User      E-Cigarette/Vaping Substances    Nicotine No     THC No     CBD No     Flavoring No     Other No     Unknown No      Social History     Tobacco Use    Smoking status: Never    Smokeless tobacco: Never   Vaping Use    Vaping status: Never Used   Substance Use Topics    Alcohol use: Never    Drug use: Never       Review of Systems   Constitutional:  Negative for chills and fever.   HENT:  Negative for congestion and sore throat.    Respiratory:  Negative for cough, chest tightness and shortness of breath.    Cardiovascular:   Negative for chest pain.   Gastrointestinal:  Negative for abdominal pain, diarrhea, nausea and vomiting.   Musculoskeletal:  Positive for arthralgias and gait problem. Negative for joint swelling.   Neurological:  Negative for dizziness, syncope, light-headedness and headaches.   All other systems reviewed and are negative.      Physical Exam  Physical Exam  Vitals reviewed.   Constitutional:       General: She is not in acute distress.     Appearance: Normal appearance. She is normal weight. She is not ill-appearing, toxic-appearing or diaphoretic.   HENT:      Head: Normocephalic and atraumatic.      Right Ear: External ear normal.      Left Ear: External ear normal.   Eyes:      General: No scleral icterus.        Right eye: No discharge.         Left eye: No discharge.      Extraocular Movements: Extraocular movements intact.      Conjunctiva/sclera: Conjunctivae normal.   Cardiovascular:      Rate and Rhythm: Normal rate and regular rhythm.      Pulses: Normal pulses.      Heart sounds: Normal heart sounds. No murmur heard.     No friction rub. No gallop.   Pulmonary:      Effort: Pulmonary effort is normal. No respiratory distress.      Breath sounds: Normal breath sounds. No stridor. No wheezing, rhonchi or rales.   Musculoskeletal:      Cervical back: Normal range of motion and neck supple.      Right knee: No swelling, deformity, ecchymosis or bony tenderness. Normal range of motion. No tenderness. No LCL laxity, MCL laxity, ACL laxity or PCL laxity.   Skin:     General: Skin is warm and dry.      Capillary Refill: Capillary refill takes less than 2 seconds.   Neurological:      Mental Status: She is alert and oriented to person, place, and time.   Psychiatric:         Mood and Affect: Mood normal.         Behavior: Behavior normal.         Vital Signs  ED Triage Vitals [12/17/23 0816]   Temperature Pulse Respirations Blood Pressure SpO2   98 °F (36.7 °C) 76 17 127/58 100 %      Temp Source Heart Rate  Source Patient Position - Orthostatic VS BP Location FiO2 (%)   Tympanic Monitor Sitting Left arm --      Pain Score       --           Vitals:    12/17/23 0816   BP: 127/58   Pulse: 76   Patient Position - Orthostatic VS: Sitting         Visual Acuity      ED Medications  Medications   ibuprofen (MOTRIN) tablet 600 mg (has no administration in time range)       Diagnostic Studies  Results Reviewed       None                   XR knee 4+ vw right injury    (Results Pending)              Procedures  Procedures         ED Course                               SBIRT 22yo+      Flowsheet Row Most Recent Value   Initial Alcohol Screen: US AUDIT-C     1. How often do you have a drink containing alcohol? 0 Filed at: 12/17/2023 0817   2. How many drinks containing alcohol do you have on a typical day you are drinking?  0 Filed at: 12/17/2023 0817   3a. Male UNDER 65: How often do you have five or more drinks on one occasion? 0 Filed at: 12/17/2023 0817   3b. FEMALE Any Age, or MALE 65+: How often do you have 4 or more drinks on one occassion? 0 Filed at: 12/17/2023 0817   Audit-C Score 0 Filed at: 12/17/2023 0817   WENDY: How many times in the past year have you...    Used an illegal drug or used a prescription medication for non-medical reasons? Never Filed at: 12/17/2023 0817                      Medical Decision Making  Patient presenting for evaluation of knee pain for 1 month.  No tenderness or joint instability on examination.  She is neurovascularly intact distally.  X-ray obtained without acute osseous abnormality upon my interpretation.  Symptoms possibly secondary to internal derangement/sprain.  She is given knee immobilizer, crutches, ibuprofen.  She was given instructions to follow-up with orthopedics.  Strict return precautions discussed.  She is in stable condition at time of discharge.    Amount and/or Complexity of Data Reviewed  Radiology: ordered.    Risk  Prescription drug management.              Disposition  Final diagnoses:   Acute pain of right knee     Time reflects when diagnosis was documented in both MDM as applicable and the Disposition within this note       Time User Action Codes Description Comment    12/17/2023  8:52 AM Fred Avila Add [M25.561] Acute pain of right knee           ED Disposition       ED Disposition   Discharge    Condition   Stable    Date/Time   Sun Dec 17, 2023 0852    Comment   Melissa Palacios discharge to home/self care.                   Follow-up Information       Follow up With Specialties Details Why Contact Info Additional Information    [facility] Emergency Medicine Go to  If symptoms worsen [address] [facility] [address]  [phone] [facility], [address] [place], [place], [address]    [facility] [place] [facility] Schedule an appointment as soon as possible for a visit  for follow up [address] [facility] [address]  [facility] [address] [phone] [facility] [place], [address] [facility] [address], [place], [place], [address] [phone]            Patient's Medications   Discharge Prescriptions    No medications on file           PDMP Review       None            ED Provider  Electronically Signed by             Fred Avila PA-C  12/17/23 0902

## 2023-12-27 ENCOUNTER — APPOINTMENT (OUTPATIENT)
Dept: RADIOLOGY | Facility: CLINIC | Age: 48
End: 2023-12-27
Payer: COMMERCIAL

## 2023-12-27 ENCOUNTER — OFFICE VISIT (OUTPATIENT)
Dept: OBGYN CLINIC | Facility: CLINIC | Age: 48
End: 2023-12-27
Payer: COMMERCIAL

## 2023-12-27 VITALS
OXYGEN SATURATION: 98 % | HEART RATE: 67 BPM | DIASTOLIC BLOOD PRESSURE: 76 MMHG | BODY MASS INDEX: 26.98 KG/M2 | RESPIRATION RATE: 18 BRPM | WEIGHT: 158 LBS | SYSTOLIC BLOOD PRESSURE: 124 MMHG | HEIGHT: 64 IN

## 2023-12-27 DIAGNOSIS — M25.561 RIGHT KNEE PAIN, UNSPECIFIED CHRONICITY: Primary | ICD-10-CM

## 2023-12-27 DIAGNOSIS — M25.561 ACUTE PAIN OF RIGHT KNEE: ICD-10-CM

## 2023-12-27 DIAGNOSIS — M23.91 INTERNAL DERANGEMENT OF RIGHT KNEE: ICD-10-CM

## 2023-12-27 DIAGNOSIS — M25.561 RIGHT KNEE PAIN, UNSPECIFIED CHRONICITY: ICD-10-CM

## 2023-12-27 PROCEDURE — 99243 OFF/OP CNSLTJ NEW/EST LOW 30: CPT | Performed by: ORTHOPAEDIC SURGERY

## 2023-12-27 PROCEDURE — 20610 DRAIN/INJ JOINT/BURSA W/O US: CPT | Performed by: ORTHOPAEDIC SURGERY

## 2023-12-27 PROCEDURE — 73562 X-RAY EXAM OF KNEE 3: CPT

## 2023-12-27 RX ORDER — METHYLPREDNISOLONE ACETATE 40 MG/ML
2 INJECTION, SUSPENSION INTRA-ARTICULAR; INTRALESIONAL; INTRAMUSCULAR; SOFT TISSUE
Status: COMPLETED | OUTPATIENT
Start: 2023-12-27 | End: 2023-12-27

## 2023-12-27 RX ORDER — BUPIVACAINE HYDROCHLORIDE 2.5 MG/ML
2 INJECTION, SOLUTION INFILTRATION; PERINEURAL
Status: COMPLETED | OUTPATIENT
Start: 2023-12-27 | End: 2023-12-27

## 2023-12-27 RX ORDER — LIDOCAINE HYDROCHLORIDE 10 MG/ML
2 INJECTION, SOLUTION EPIDURAL; INFILTRATION; INTRACAUDAL; PERINEURAL
Status: COMPLETED | OUTPATIENT
Start: 2023-12-27 | End: 2023-12-27

## 2023-12-27 RX ADMIN — LIDOCAINE HYDROCHLORIDE 2 ML: 10 INJECTION, SOLUTION EPIDURAL; INFILTRATION; INTRACAUDAL; PERINEURAL at 09:00

## 2023-12-27 RX ADMIN — METHYLPREDNISOLONE ACETATE 2 ML: 40 INJECTION, SUSPENSION INTRA-ARTICULAR; INTRALESIONAL; INTRAMUSCULAR; SOFT TISSUE at 09:00

## 2023-12-27 RX ADMIN — BUPIVACAINE HYDROCHLORIDE 2 ML: 2.5 INJECTION, SOLUTION INFILTRATION; PERINEURAL at 09:00

## 2023-12-27 NOTE — PROGRESS NOTES
HPI:  Patient is a 48 y.o. year old  female  who presents with chief complaint of right knee pain. She is not complaining of pain at rest, but feels pain rated 7/10 while walking or going up and down stairs. She was treated in the ED with ibuprofen 12/17/23 for right knee pain that has been persistent since prior to Thanksgiving. She has been using ice and ibuprofen for symptom management. She denies swelling. She notices catching and locking. She went to her [facility] site on 11/13/23 and first notices that she bent down and her knee locked causing her to have difficulty standing up.       ROS:   General: No fever, no chills, no weight loss, no weight gain  HEENT:  No loss of hearing, no nose bleeds, no sore throat  Eyes:  No eye pain, no red eyes, no visual disturbance  Respiratory:  No cough, no shortness of breath, no wheezing  Cardiovascular:  No chest pain, no palpitations, no edema  GI: No abdominal pain, no nausea, no vomiting  Endocrine: No frequent urination, no excessive thirst  Urinary:  No dysuria, no hematuria, no incontinence  Musculoskeletal: see HPI and PE  Skin:  No rash, no wounds  Neurological:  No dizziness, no headache, no numbness  Psychiatric:  No difficulty concentrating, no depression, no suicide thoughts, no anxiety  Review of all other systems is negative    PMH:  Past Medical History:   Diagnosis Date    CSF leak 2021    Pneumothorax on right 2015       PSH:  Past Surgical History:   Procedure Laterality Date    DILATION AND CURETTAGE OF UTERUS      IR SPINE PROCEDURE  6/16/2021    MYOMECTOMY         Medications:  No current outpatient medications on file.     No current facility-administered medications for this visit.       Allergies:  No Known Allergies    Family History:  Family History   Problem Relation Age of Onset    Migraines Mother     Cirrhosis Father     No Known Problems Sister     No Known Problems Sister     Stomach cancer Brother     No Known Problems Brother   "      Social History:  Social History     Occupational History    Not on file   Tobacco Use    Smoking status: Never    Smokeless tobacco: Never   Vaping Use    Vaping status: Never Used   Substance and Sexual Activity    Alcohol use: Yes     Alcohol/week: 2.0 standard drinks of alcohol     Types: 2 Glasses of wine per week     Comment: occasional    Drug use: Never    Sexual activity: Yes     Partners: Male       Physical Exam:  General :  Alert, cooperative, no distress, appears stated age  Blood pressure 124/76, pulse 67, resp. rate 18, height 5' 4\" (1.626 m), weight 71.7 kg (158 lb), SpO2 98%.   Head:  Normocephalic, without obvious abnormality, atraumatic   Eyes:  Conjunctiva/corneas clear, EOM's intact,   Ears:  Both ears normal appearance, no hearing deficits.    Nose: Nares normal, septum midline, no drainage    Neck: Supple,  trachea midline, no adenopathy, no tenderness, no mass   Back:   Symmetric, no curvature, ROM normal, no tenderness   Lungs:   Respirations unlabored   Chest Wall:  No tenderness or deformity   Extremities: Extremities normal, atraumatic, no cyanosis or edema      Pulses: 2+ and symmetric   Skin: Skin color, texture, turgor normal, no rashes or lesions      Neurologic: Normal           Ortho Exam  Right Knee  Range of motion from 0 to 120.    There is no crepitus with range of motion.   There is no effusion.    There is tenderness over the lateral joint line, patellar tendon.    There is 5/5 quadriceps strength and equal tone.    The patient is able to perform a straight leg raise.    Negative  patellar grind test.  Anterior drawer tests is negative  negative Lachman Test.   Posterior drawer test is   negative   Varus stress testing reveals no instability at 0 and 30 degrees   Valgus stress testing reveals no instability at 0 and 30 degrees  Shraddha's testing is negative   The patient is neurovascular intact distally.    Imaging Studies:     The following imaging studies were reviewed " "in office today. My findings are noted.  X-rays of the right knee obtained 12/27/23 demonstrate no acute fracture or dislocation. Well preserved joint spaces.    Assessment  Encounter Diagnoses   Name Primary?    Right knee pain, unspecified chronicity Yes    Acute pain of right knee     Internal derangement of right knee      Large joint arthrocentesis: R knee  Universal Protocol:  Consent: Verbal consent obtained.  Risks and benefits: risks, benefits and alternatives were discussed  Consent given by: patient  Time out: Immediately prior to procedure a \"time out\" was called to verify the correct patient, procedure, equipment, support staff and site/side marked as required.  Patient understanding: patient states understanding of the procedure being performed  Site marked: the operative site was marked  Patient identity confirmed: verbally with patient  Supporting Documentation  Indications: pain   Procedure Details  Location: knee - R knee  Preparation: Patient was prepped and draped in the usual sterile fashion  Needle size: 22 G  Ultrasound guidance: no  Approach: anterolateral  Medications administered: 2 mL bupivacaine 0.25 %; 2 mL methylPREDNISolone acetate 40 mg/mL; 2 mL lidocaine (PF) 1 %    Patient tolerance: patient tolerated the procedure well with no immediate complications  Dressing:  Sterile dressing applied            Plan:  48 y.o female with right knee internal derangement, suspected lateral meniscus tear  X-rays reviewed in the office today  MRI study was ordered for internal derangement of the right knee.  Wear knee brace as needed  She was provided a corticosteroid injection for her right knee. She tolerated the procedure well.  She will follow up after MRI study    Scribe Attestation      I,:  Lynette Monique am acting as a scribe while in the presence of the attending physician.:       I,:  Mckenzie Giraldo MD personally performed the services described in this documentation    as scribed in my " presence.:

## 2023-12-27 NOTE — LETTER
December 28, 2023     Fred Avila PA-C  [address] [facility] [address]    Patient: Melissa Palacios   YOB: 1975   Date of Visit: 12/27/2023       Dear Dr. Avila:    Thank you for referring Melissa Palacios to me for evaluation. Below are my notes for this consultation.    If you have questions, please do not hesitate to call me. I look forward to following your patient along with you.         Sincerely,        Mckenzie Giraldo MD        CC: No Recipients    Mckenzie Giraldo MD  12/27/2023 12:03 PM  Signed  HPI:  Patient is a 48 y.o. year old  female  who presents with chief complaint of right knee pain. She is not complaining of pain at rest, but feels pain rated 7/10 while walking or going up and down stairs. She was treated in the ED with ibuprofen 12/17/23 for right knee pain that has been persistent since prior to Thanksgiving. She has been using ice and ibuprofen for symptom management. She denies swelling. She notices catching and locking. She went to her [facility] site on 11/13/23 and first notices that she bent down and her knee locked causing her to have difficulty standing up.       ROS:   General: No fever, no chills, no weight loss, no weight gain  HEENT:  No loss of hearing, no nose bleeds, no sore throat  Eyes:  No eye pain, no red eyes, no visual disturbance  Respiratory:  No cough, no shortness of breath, no wheezing  Cardiovascular:  No chest pain, no palpitations, no edema  GI: No abdominal pain, no nausea, no vomiting  Endocrine: No frequent urination, no excessive thirst  Urinary:  No dysuria, no hematuria, no incontinence  Musculoskeletal: see HPI and PE  Skin:  No rash, no wounds  Neurological:  No dizziness, no headache, no numbness  Psychiatric:  No difficulty concentrating, no depression, no suicide thoughts, no anxiety  Review of all other systems is negative    PMH:  Past Medical History:   Diagnosis Date   • CSF leak 2021   • Pneumothorax on right 2015  "      PSH:  Past Surgical History:   Procedure Laterality Date   • DILATION AND CURETTAGE OF UTERUS     • IR SPINE PROCEDURE  6/16/2021   • MYOMECTOMY         Medications:  No current outpatient medications on file.     No current facility-administered medications for this visit.       Allergies:  No Known Allergies    Family History:  Family History   Problem Relation Age of Onset   • Migraines Mother    • Cirrhosis Father    • No Known Problems Sister    • No Known Problems Sister    • Stomach cancer Brother    • No Known Problems Brother        Social History:  Social History     Occupational History   • Not on file   Tobacco Use   • Smoking status: Never   • Smokeless tobacco: Never   Vaping Use   • Vaping status: Never Used   Substance and Sexual Activity   • Alcohol use: Yes     Alcohol/week: 2.0 standard drinks of alcohol     Types: 2 Glasses of wine per week     Comment: occasional   • Drug use: Never   • Sexual activity: Yes     Partners: Male       Physical Exam:  General :  Alert, cooperative, no distress, appears stated age  Blood pressure 124/76, pulse 67, resp. rate 18, height 5' 4\" (1.626 m), weight 71.7 kg (158 lb), SpO2 98%.   Head:  Normocephalic, without obvious abnormality, atraumatic   Eyes:  Conjunctiva/corneas clear, EOM's intact,   Ears:  Both ears normal appearance, no hearing deficits.    Nose: Nares normal, septum midline, no drainage    Neck: Supple,  trachea midline, no adenopathy, no tenderness, no mass   Back:   Symmetric, no curvature, ROM normal, no tenderness   Lungs:   Respirations unlabored   Chest Wall:  No tenderness or deformity   Extremities: Extremities normal, atraumatic, no cyanosis or edema      Pulses: 2+ and symmetric   Skin: Skin color, texture, turgor normal, no rashes or lesions      Neurologic: Normal           Ortho Exam  Right Knee  Range of motion from 0 to 120.    There is no crepitus with range of motion.   There is no effusion.    There is tenderness over the " "lateral joint line, patellar tendon.    There is 5/5 quadriceps strength and equal tone.    The patient is able to perform a straight leg raise.    Negative  patellar grind test.  Anterior drawer tests is negative  negative Lachman Test.   Posterior drawer test is   negative   Varus stress testing reveals no instability at 0 and 30 degrees   Valgus stress testing reveals no instability at 0 and 30 degrees  Shraddha's testing is negative   The patient is neurovascular intact distally.    Imaging Studies:     The following imaging studies were reviewed in office today. My findings are noted.  X-rays of the right knee obtained 12/27/23 demonstrate no acute fracture or dislocation. Well preserved joint spaces.    Assessment  Encounter Diagnoses   Name Primary?   • Right knee pain, unspecified chronicity Yes   • Acute pain of right knee    • Internal derangement of right knee      Large joint arthrocentesis: R knee  Universal Protocol:  Consent: Verbal consent obtained.  Risks and benefits: risks, benefits and alternatives were discussed  Consent given by: patient  Time out: Immediately prior to procedure a \"time out\" was called to verify the correct patient, procedure, equipment, support staff and site/side marked as required.  Patient understanding: patient states understanding of the procedure being performed  Site marked: the operative site was marked  Patient identity confirmed: verbally with patient  Supporting Documentation  Indications: pain   Procedure Details  Location: knee - R knee  Preparation: Patient was prepped and draped in the usual sterile fashion  Needle size: 22 G  Ultrasound guidance: no  Approach: anterolateral  Medications administered: 2 mL bupivacaine 0.25 %; 2 mL methylPREDNISolone acetate 40 mg/mL; 2 mL lidocaine (PF) 1 %    Patient tolerance: patient tolerated the procedure well with no immediate complications  Dressing:  Sterile dressing applied            Plan:  48 y.o female with right " knee internal derangement, suspected lateral meniscus tear  X-rays reviewed in the office today  MRI study was ordered for internal derangement of the right knee.  Wear knee brace as needed  She was provided a corticosteroid injection for her right knee. She tolerated the procedure well.  She will follow up after MRI study    Scribe Attestation      I,:  Lynette Monique am acting as a scribe while in the presence of the attending physician.:       I,:  Mckenzie Giraldo MD personally performed the services described in this documentation    as scribed in my presence.:

## 2024-01-03 ENCOUNTER — HOSPITAL ENCOUNTER (OUTPATIENT)
Dept: MRI IMAGING | Facility: CLINIC | Age: 49
Discharge: HOME | End: 2024-01-03
Payer: COMMERCIAL

## 2024-01-03 DIAGNOSIS — M23.91 INTERNAL DERANGEMENT OF RIGHT KNEE: ICD-10-CM

## 2024-01-03 DIAGNOSIS — M25.561 ACUTE PAIN OF RIGHT KNEE: ICD-10-CM

## 2024-01-03 DIAGNOSIS — M25.561 RIGHT KNEE PAIN, UNSPECIFIED CHRONICITY: ICD-10-CM

## 2024-01-03 PROCEDURE — G1004 CDSM NDSC: HCPCS

## 2024-01-03 PROCEDURE — 73721 MRI JNT OF LWR EXTRE W/O DYE: CPT

## 2024-01-12 ENCOUNTER — OFFICE VISIT (OUTPATIENT)
Dept: OBGYN CLINIC | Facility: CLINIC | Age: 49
End: 2024-01-12
Payer: COMMERCIAL

## 2024-01-12 VITALS
OXYGEN SATURATION: 98 % | DIASTOLIC BLOOD PRESSURE: 72 MMHG | BODY MASS INDEX: 27.14 KG/M2 | SYSTOLIC BLOOD PRESSURE: 104 MMHG | WEIGHT: 159 LBS | HEART RATE: 67 BPM | HEIGHT: 64 IN | RESPIRATION RATE: 18 BRPM

## 2024-01-12 DIAGNOSIS — M23.91 INTERNAL DERANGEMENT OF RIGHT KNEE: Primary | ICD-10-CM

## 2024-01-12 DIAGNOSIS — M22.2X1 PATELLOFEMORAL DISORDER OF RIGHT KNEE: ICD-10-CM

## 2024-01-12 PROCEDURE — 99213 OFFICE O/P EST LOW 20 MIN: CPT | Performed by: ORTHOPAEDIC SURGERY

## 2024-01-12 NOTE — PROGRESS NOTES
HPI:  Patient is a 48 y.o. year old female who presents for follow-up of right knee pain. Patient was last seen in the office on 12/27/2023 where patient was given a right knee cortisone injection and MRI of right knee was ordered to evaluate for internal derangement. Today patient reports she feels knee pain has improved 90% since receiving the cortisone injection. Patient states she still has some pain in the anterior aspect of the knee that occurs with bending the knee and going up and down steps but overall improved. Patient denies any numbness or tingling in the knee.       ROS:   General: No fever, no chills, no weight loss, no weight gain  HEENT:  No loss of hearing, no nose bleeds, no sore throat  Eyes:  No eye pain, no red eyes, no visual disturbance  Respiratory:  No cough, no shortness of breath, no wheezing  Cardiovascular:  No chest pain, no palpitations, no edema  GI: No abdominal pain, no nausea, no vomiting  Endocrine: No frequent urination, no excessive thirst  Urinary:  No dysuria, no hematuria, no incontinence  Musculoskeletal: see HPI and PE  Skin:  No rash, no wounds  Neurological:  No dizziness, no headache, no numbness  Psychiatric:  No difficulty concentrating, no depression, no suicide thoughts, no anxiety  Review of all other systems is negative    PMH:  Past Medical History:   Diagnosis Date    CSF leak 2021    Pneumothorax on right 2015       PSH:  Past Surgical History:   Procedure Laterality Date    DILATION AND CURETTAGE OF UTERUS      IR SPINE PROCEDURE  6/16/2021    MYOMECTOMY         Medications:  No current outpatient medications on file.     No current facility-administered medications for this visit.       Allergies:  No Known Allergies    Family History:  Family History   Problem Relation Age of Onset    Migraines Mother     Cirrhosis Father     No Known Problems Sister     No Known Problems Sister     Stomach cancer Brother     No Known Problems Brother        Social  "History:  Social History     Occupational History    Not on file   Tobacco Use    Smoking status: Never    Smokeless tobacco: Never   Vaping Use    Vaping status: Never Used   Substance and Sexual Activity    Alcohol use: Yes     Alcohol/week: 2.0 standard drinks of alcohol     Types: 2 Glasses of wine per week     Comment: occasional    Drug use: Never    Sexual activity: Yes     Partners: Male       Physical Exam:  General :  Alert, cooperative, no distress, appears stated age  Blood pressure 104/72, pulse 67, resp. rate 18, height 5' 4\" (1.626 m), weight 72.1 kg (159 lb), SpO2 98%.   Head:  Normocephalic, without obvious abnormality, atraumatic   Eyes:  Conjunctiva/corneas clear, EOM's intact,   Ears:  Both ears normal appearance, no hearing deficits.    Nose: Nares normal, septum midline, no drainage    Neck: Supple,  trachea midline, no adenopathy, no tenderness, no mass   Back:   Symmetric, no curvature, ROM normal, no tenderness   Lungs:   Respirations unlabored   Chest Wall:  No tenderness or deformity   Extremities: Extremities normal, atraumatic, no cyanosis or edema      Pulses: 2+ and symmetric   Skin: Skin color, texture, turgor normal, no rashes or lesions      Neurologic: Normal           Right Knee Exam     Muscle Strength   The patient has normal right knee strength.    Tenderness   The patient is experiencing no tenderness.     Range of Motion   Extension:  0   Flexion:  100     Tests   Varus: negative Valgus: negative    Other   Erythema: absent  Scars: absent  Sensation: normal  Swelling: none            Imaging Studies:     The following imaging studies were reviewed in office today. My findings are noted.  MRI of right knee shows mild fissuring of the patellar articular cartilage. There is a 4 x 4 mm osteochondral lesion of the medial femoral trochlea with thinning of the overlying articular cartilage and adjacent subchondral cystic change. No fluid signal is seen undercutting this lesion or " detachment. There is focal edema within the superolateral Hoffa's fat pad suggestive of impingement. No significant lateral patella tilt or translation. No patella laz. Menisci and ligaments appear intact. Small joint effusion and tiny partially ruptured Baker's cyst.       Assessment  Encounter Diagnoses   Name Primary?    Internal derangement of right knee Yes    Patellofemoral disorder of right knee          Plan:  Patient is a 48-year-old female with right knee patellofemoral syndrome, early signs of knee osteoarthritis  *MRI of right knee was reviewed.   *Patient was advised on exercises to perform for strengthening of the right knee as well as things to be avoided such as deep squatting and extension exercises from a flexed position.   *Patient may follow-up as needed for any new or worsening symptoms.       Scribe Attestation      I,:  Natasha Coates PA-C am acting as a scribe while in the presence of the attending physician.:       I,:  Mckenzie Giraldo MD personally performed the services described in this documentation    as scribed in my presence.:

## 2024-03-25 ENCOUNTER — OFFICE VISIT (OUTPATIENT)
Dept: FAMILY MEDICINE CLINIC | Facility: CLINIC | Age: 49
End: 2024-03-25
Payer: COMMERCIAL

## 2024-03-25 VITALS
HEART RATE: 77 BPM | BODY MASS INDEX: 26.6 KG/M2 | OXYGEN SATURATION: 99 % | DIASTOLIC BLOOD PRESSURE: 76 MMHG | HEIGHT: 64 IN | WEIGHT: 155.8 LBS | SYSTOLIC BLOOD PRESSURE: 112 MMHG | TEMPERATURE: 98.2 F

## 2024-03-25 DIAGNOSIS — R31.0 GROSS HEMATURIA: ICD-10-CM

## 2024-03-25 DIAGNOSIS — Z00.00 ANNUAL PHYSICAL EXAM: Primary | ICD-10-CM

## 2024-03-25 DIAGNOSIS — Z12.11 COLON CANCER SCREENING: ICD-10-CM

## 2024-03-25 DIAGNOSIS — R19.5 CHANGE IN STOOL: ICD-10-CM

## 2024-03-25 DIAGNOSIS — R07.1 CHEST PAIN ON BREATHING: ICD-10-CM

## 2024-03-25 PROBLEM — H93.8X2 CLOGGED EAR, LEFT: Status: RESOLVED | Noted: 2023-02-23 | Resolved: 2024-03-25

## 2024-03-25 PROBLEM — H69.92 DYSFUNCTION OF LEFT EUSTACHIAN TUBE: Status: RESOLVED | Noted: 2023-02-09 | Resolved: 2024-03-25

## 2024-03-25 PROBLEM — Z12.4 ENCOUNTER FOR PAPANICOLAOU SMEAR OF CERVIX: Status: RESOLVED | Noted: 2022-09-15 | Resolved: 2024-03-25

## 2024-03-25 PROCEDURE — 99214 OFFICE O/P EST MOD 30 MIN: CPT | Performed by: NURSE PRACTITIONER

## 2024-03-25 PROCEDURE — 99396 PREV VISIT EST AGE 40-64: CPT | Performed by: NURSE PRACTITIONER

## 2024-03-25 NOTE — ASSESSMENT & PLAN NOTE
Patient reports new onset of constipation within the last month.  Advised patient to increase her water intake and fiber.  Patient is due for colonoscopy, provided referral.

## 2024-03-25 NOTE — ASSESSMENT & PLAN NOTE
This occurred last week.  This is since resolved.  Patient reports it feels similar to when she had a spontaneous pneumothorax.  Lungs clear on exam.  Chest x-ray ordered.

## 2024-03-25 NOTE — PROGRESS NOTES
ADULT ANNUAL PHYSICAL  [facility] [address] [facility]    NAME: Melissa Palacios  AGE: 48 y.o. SEX: female  : 1975     DATE: 3/25/2024     Assessment and Plan:     Problem List Items Addressed This Visit       Annual physical exam - Primary    Gross hematuria     Patient has noted 2 episodes of what she thinks may be blood in her urine as her urine appeared dark.  To obtain lab work, UA, and renal ultrasound.  Will call with results.         Relevant Orders    UA (URINE) with reflex to Scope    US kidney and bladder    Chest pain on breathing     This occurred last week.  This is since resolved.  Patient reports it feels similar to when she had a spontaneous pneumothorax.  Lungs clear on exam.  Chest x-ray ordered.         Relevant Orders    XR chest pa & lateral    Change in stool     Patient reports new onset of constipation within the last month.  Advised patient to increase her water intake and fiber.  Patient is due for colonoscopy, provided referral.         Relevant Orders    Ambulatory referral to Gastroenterology    CBC and differential    Comprehensive metabolic panel    Lipid Panel with Direct LDL reflex    TSH, 3rd generation with Free T4 reflex     Other Visit Diagnoses       Colon cancer screening        Relevant Orders    Ambulatory referral to Gastroenterology            Immunizations and preventive care screenings were discussed with patient today. Appropriate education was printed on patient's after visit summary.    Counseling:  Alcohol/drug use: discussed moderation in alcohol intake, the recommendations for healthy alcohol use, and avoidance of illicit drug use.  Dental Health: discussed importance of regular tooth brushing, flossing, and dental visits.  Injury prevention: discussed safety/seat belts, safety helmets, smoke detectors, carbon dioxide detectors, and smoking near bedding or upholstery.  Sexual health:  discussed sexually transmitted diseases, partner selection, use of condoms, avoidance of unintended pregnancy, and contraceptive alternatives.  Exercise: the importance of regular exercise/physical activity was discussed. Recommend exercise 3-5 times per week for at least 30 minutes.          Return in about 2 months (around 2024) for Recheck.     Chief Complaint:     Chief Complaint   Patient presents with    Annual Exam      History of Present Illness:     Adult Annual Physical   Patient here for a comprehensive physical exam. The patient reports problems - constipation, occasional dark urine .  Cammie presents reporting a decrease in appetite, constipation, and abdominal discomfort for the past month.  She has lost about 3 pounds.  She had 2 episodes of dark urine which she thinks may be blood in her urine.  Her brother  from gastric cancer so she is concerned.  Melissa is due for colonoscopy.  Also of note, her  is working in [place] for the past year.  Cammie works from home.  Most of her family lives in [place] or in [place].  She has noted that she does not typically enjoy the things she used to.  She tries to see her family often but has not since January due to her work schedule.    Diet and Physical Activity  Diet/Nutrition: poor diet.   Exercise: no formal exercise.      Depression Screening  PHQ-2/9 Depression Screening    Little interest or pleasure in doing things: 0 - not at all  Feeling down, depressed, or hopeless: 0 - not at all  PHQ-2 Score: 0  PHQ-2 Interpretation: Negative depression screen       General Health  Sleep: sleeps well and gets 7-8 hours of sleep on average.   Hearing: normal - bilateral.  Vision: most recent eye exam <1 year ago and wears glasses.   Dental: regular dental visits.       /GYN Health  Follows with gynecology? no   Patient is: premenopausal  Last menstrual period: 24  Contraceptive method: Not sexually active     Advanced Care Planning  Do you  have an advanced directive? yes  Do you have a durable medical power of ? no  ACP document given to the patient? yes     Review of Systems:     Review of Systems   Constitutional:  Positive for appetite change (decreased) and unexpected weight change (weight loss).   HENT: Negative.     Eyes: Negative.    Respiratory: Negative.     Cardiovascular:  Positive for chest pain.   Gastrointestinal:  Positive for constipation.   Endocrine: Negative.    Genitourinary:  Positive for hematuria.   Musculoskeletal: Negative.    Skin: Negative.    Allergic/Immunologic: Negative.    Neurological: Negative.    Hematological: Negative.    Psychiatric/Behavioral:  Positive for dysphoric mood.       Past Medical History:     Past Medical History:   Diagnosis Date    CSF leak 2021    Pneumothorax on right 2015      Past Surgical History:     Past Surgical History:   Procedure Laterality Date    DILATION AND CURETTAGE OF UTERUS      IR SPINE PROCEDURE  6/16/2021    MYOMECTOMY        Social History:     Social History     Socioeconomic History    Marital status: /Civil Union     Spouse name: None    Number of children: None    Years of education: None    Highest education level: None   Occupational History    None   Tobacco Use    Smoking status: Never    Smokeless tobacco: Never   Vaping Use    Vaping status: Never Used   Substance and Sexual Activity    Alcohol use: Yes     Alcohol/week: 2.0 standard drinks of alcohol     Types: 2 Glasses of wine per week     Comment: occasional    Drug use: Never    Sexual activity: Yes     Partners: Male   Other Topics Concern    None   Social History Narrative    None     Social Determinants of Health     Financial Resource Strain: Not on file   Food Insecurity: Not on file   Transportation Needs: Not on file   Physical Activity: Not on file   Stress: Not on file   Social Connections: Not on file   Intimate Partner Violence: Not on file   Housing Stability: Not on file      Family  "History:     Family History   Problem Relation Age of Onset    Migraines Mother     Cirrhosis Father     No Known Problems Sister     No Known Problems Sister     Stomach cancer Brother     No Known Problems Brother       Current Medications:     No current outpatient medications on file.     No current facility-administered medications for this visit.      Allergies:     No Known Allergies   Physical Exam:     /76 (BP Location: Right arm, Patient Position: Sitting)   Pulse 77   Temp 98.2 °F (36.8 °C)   Ht 5' 4\" (1.626 m)   Wt 70.7 kg (155 lb 12.8 oz)   SpO2 99%   BMI 26.74 kg/m²     Physical Exam  Vitals and nursing note reviewed.   Constitutional:       General: She is not in acute distress.     Appearance: Normal appearance. She is well-developed. She is not ill-appearing, toxic-appearing or diaphoretic.   HENT:      Head: Normocephalic and atraumatic.      Right Ear: Tympanic membrane and external ear normal.      Left Ear: Tympanic membrane and external ear normal.      Nose: Nose normal.      Mouth/Throat:      Mouth: Mucous membranes are moist.      Pharynx: Oropharynx is clear. No oropharyngeal exudate.   Eyes:      Conjunctiva/sclera: Conjunctivae normal.      Pupils: Pupils are equal, round, and reactive to light.   Neck:      Thyroid: No thyromegaly.   Cardiovascular:      Rate and Rhythm: Normal rate and regular rhythm.      Pulses: Normal pulses.      Heart sounds: Normal heart sounds. No murmur heard.  Pulmonary:      Effort: Pulmonary effort is normal. No respiratory distress.      Breath sounds: Normal breath sounds. No stridor. No wheezing or rales.   Chest:      Chest wall: No tenderness.   Abdominal:      General: Bowel sounds are normal. There is no distension.      Palpations: Abdomen is soft. There is no mass.      Tenderness: There is no abdominal tenderness. There is no guarding or rebound.      Hernia: No hernia is present.   Musculoskeletal:         General: Normal range of " motion.      Cervical back: Normal range of motion and neck supple.   Lymphadenopathy:      Cervical: No cervical adenopathy.   Skin:     General: Skin is warm and dry.      Capillary Refill: Capillary refill takes less than 2 seconds.   Neurological:      General: No focal deficit present.      Mental Status: She is alert and oriented to person, place, and time.      Cranial Nerves: No cranial nerve deficit.      Gait: Gait normal.   Psychiatric:         Mood and Affect: Mood normal.         Behavior: Behavior normal.         Thought Content: Thought content normal.         Judgment: Judgment normal.          JOSE Villalta  [facility] [address] [facility]

## 2024-03-25 NOTE — ASSESSMENT & PLAN NOTE
Patient has noted 2 episodes of what she thinks may be blood in her urine as her urine appeared dark.  To obtain lab work, UA, and renal ultrasound.  Will call with results.

## 2024-04-16 ENCOUNTER — APPOINTMENT (OUTPATIENT)
Age: 49
End: 2024-04-16
Payer: COMMERCIAL

## 2024-04-16 ENCOUNTER — APPOINTMENT (OUTPATIENT)
Dept: RADIOLOGY | Facility: CLINIC | Age: 49
End: 2024-04-16
Payer: COMMERCIAL

## 2024-04-16 ENCOUNTER — TELEPHONE (OUTPATIENT)
Age: 49
End: 2024-04-16

## 2024-04-16 DIAGNOSIS — R07.1 CHEST PAIN ON BREATHING: ICD-10-CM

## 2024-04-16 DIAGNOSIS — R19.5 CHANGE IN STOOL: ICD-10-CM

## 2024-04-16 DIAGNOSIS — R31.0 GROSS HEMATURIA: ICD-10-CM

## 2024-04-16 LAB
ALBUMIN SERPL BCP-MCNC: 4.3 G/DL (ref 3.5–5)
ALP SERPL-CCNC: 42 U/L (ref 34–104)
ALT SERPL W P-5'-P-CCNC: 14 U/L (ref 7–52)
ANION GAP SERPL CALCULATED.3IONS-SCNC: 6 MMOL/L (ref 4–13)
AST SERPL W P-5'-P-CCNC: 17 U/L (ref 13–39)
BACTERIA UR QL AUTO: NORMAL /HPF
BASOPHILS # BLD AUTO: 0.02 THOUSANDS/ÂΜL (ref 0–0.1)
BASOPHILS NFR BLD AUTO: 0 % (ref 0–1)
BILIRUB SERPL-MCNC: 0.67 MG/DL (ref 0.2–1)
BILIRUB UR QL STRIP: NEGATIVE
BUN SERPL-MCNC: 17 MG/DL (ref 5–25)
CALCIUM SERPL-MCNC: 9.1 MG/DL (ref 8.4–10.2)
CHLORIDE SERPL-SCNC: 104 MMOL/L (ref 96–108)
CHOLEST SERPL-MCNC: 198 MG/DL
CLARITY UR: CLEAR
CO2 SERPL-SCNC: 28 MMOL/L (ref 21–32)
COLOR UR: ABNORMAL
CREAT SERPL-MCNC: 0.66 MG/DL (ref 0.6–1.3)
EOSINOPHIL # BLD AUTO: 0.08 THOUSAND/ÂΜL (ref 0–0.61)
EOSINOPHIL NFR BLD AUTO: 1 % (ref 0–6)
ERYTHROCYTE [DISTWIDTH] IN BLOOD BY AUTOMATED COUNT: 13 % (ref 11.6–15.1)
GFR SERPL CREATININE-BSD FRML MDRD: 106 ML/MIN/1.73SQ M
GLUCOSE P FAST SERPL-MCNC: 86 MG/DL (ref 65–99)
GLUCOSE UR STRIP-MCNC: NEGATIVE MG/DL
HCT VFR BLD AUTO: 38.6 % (ref 34.8–46.1)
HDLC SERPL-MCNC: 71 MG/DL
HGB BLD-MCNC: 12.2 G/DL (ref 11.5–15.4)
HGB UR QL STRIP.AUTO: NEGATIVE
IMM GRANULOCYTES # BLD AUTO: 0.01 THOUSAND/UL (ref 0–0.2)
IMM GRANULOCYTES NFR BLD AUTO: 0 % (ref 0–2)
KETONES UR STRIP-MCNC: NEGATIVE MG/DL
LDLC SERPL CALC-MCNC: 116 MG/DL (ref 0–100)
LEUKOCYTE ESTERASE UR QL STRIP: NEGATIVE
LYMPHOCYTES # BLD AUTO: 2.33 THOUSANDS/ÂΜL (ref 0.6–4.47)
LYMPHOCYTES NFR BLD AUTO: 32 % (ref 14–44)
MCH RBC QN AUTO: 30.6 PG (ref 26.8–34.3)
MCHC RBC AUTO-ENTMCNC: 31.6 G/DL (ref 31.4–37.4)
MCV RBC AUTO: 97 FL (ref 82–98)
MONOCYTES # BLD AUTO: 0.63 THOUSAND/ÂΜL (ref 0.17–1.22)
MONOCYTES NFR BLD AUTO: 9 % (ref 4–12)
NEUTROPHILS # BLD AUTO: 4.13 THOUSANDS/ÂΜL (ref 1.85–7.62)
NEUTS SEG NFR BLD AUTO: 58 % (ref 43–75)
NITRITE UR QL STRIP: NEGATIVE
NON-SQ EPI CELLS URNS QL MICRO: NORMAL /HPF
NRBC BLD AUTO-RTO: 0 /100 WBCS
PH UR STRIP.AUTO: 6 [PH]
PLATELET # BLD AUTO: 194 THOUSANDS/UL (ref 149–390)
PMV BLD AUTO: 11.6 FL (ref 8.9–12.7)
POTASSIUM SERPL-SCNC: 4 MMOL/L (ref 3.5–5.3)
PROT SERPL-MCNC: 7.7 G/DL (ref 6.4–8.4)
PROT UR STRIP-MCNC: ABNORMAL MG/DL
RBC # BLD AUTO: 3.99 MILLION/UL (ref 3.81–5.12)
RBC #/AREA URNS AUTO: NORMAL /HPF
SODIUM SERPL-SCNC: 138 MMOL/L (ref 135–147)
SP GR UR STRIP.AUTO: 1.02 (ref 1–1.03)
TRIGL SERPL-MCNC: 53 MG/DL
TSH SERPL DL<=0.05 MIU/L-ACNC: 0.73 UIU/ML (ref 0.45–4.5)
UROBILINOGEN UR STRIP-ACNC: <2 MG/DL
WBC # BLD AUTO: 7.2 THOUSAND/UL (ref 4.31–10.16)
WBC #/AREA URNS AUTO: NORMAL /HPF

## 2024-04-16 PROCEDURE — 71046 X-RAY EXAM CHEST 2 VIEWS: CPT

## 2024-04-16 PROCEDURE — 84443 ASSAY THYROID STIM HORMONE: CPT

## 2024-04-16 PROCEDURE — 85025 COMPLETE CBC W/AUTO DIFF WBC: CPT

## 2024-04-16 PROCEDURE — 36415 COLL VENOUS BLD VENIPUNCTURE: CPT

## 2024-04-16 PROCEDURE — 80053 COMPREHEN METABOLIC PANEL: CPT

## 2024-04-16 PROCEDURE — 80061 LIPID PANEL: CPT

## 2024-04-16 PROCEDURE — 81001 URINALYSIS AUTO W/SCOPE: CPT

## 2024-04-22 ENCOUNTER — OFFICE VISIT (OUTPATIENT)
Age: 49
End: 2024-04-22
Payer: COMMERCIAL

## 2024-04-22 VITALS
WEIGHT: 155 LBS | HEART RATE: 56 BPM | HEIGHT: 64 IN | BODY MASS INDEX: 26.46 KG/M2 | OXYGEN SATURATION: 98 % | SYSTOLIC BLOOD PRESSURE: 110 MMHG | DIASTOLIC BLOOD PRESSURE: 70 MMHG

## 2024-04-22 DIAGNOSIS — Z12.11 SCREENING FOR COLON CANCER: Primary | ICD-10-CM

## 2024-04-22 PROCEDURE — 99204 OFFICE O/P NEW MOD 45 MIN: CPT | Performed by: PHYSICIAN ASSISTANT

## 2024-04-22 NOTE — PROGRESS NOTES
[facility] - Outpatient Consultation  Melissa Palacios 48 y.o. female MRN: [id number]  Encounter: [id number]          ASSESSMENT AND PLAN:      1. Screening for colon cancer    Patient presents to schedule a colonoscopy. No family history of colon cancer or polyps.  She also reports issues with constipation x several months. She had a recent normal TSH level.  She tried fiber/adequate water intake without relief.    Will plan for colonoscopy to investigate.  Will begin Miralax 1 capful daily to regulate her bowel movements.  If she fails Miralax, we can utilize Linzess.    ______________________________________________________________________    HPI:  Patient is a pleasant 48 year old female who presents to the office to schedule a colonoscopy (first one).  No family history of colon cancer or polyps.  She has been having issues with constipation over the past few months.  She reports a BM every 2-3 days and previously went daily.  She will also have some gas discomfort.  No rectal bleeding.  She tried fiber supplementation/increasing fiber and increasing water intake without relief. She had a recent normal TSH level.      REVIEW OF SYSTEMS:    CONSTITUTIONAL: Denies any fever, chills, rigors, and weight loss.  HEENT: No earache or tinnitus. Denies hearing loss or visual disturbances.  CARDIOVASCULAR: No chest pain or palpitations.   RESPIRATORY: Denies any cough, hemoptysis, shortness of breath or dyspnea on exertion.  GASTROINTESTINAL: As noted in the History of Present Illness.   GENITOURINARY: No problems with urination. Denies any hematuria or dysuria.  NEUROLOGIC: No dizziness or vertigo, denies headaches.   MUSCULOSKELETAL: Denies any muscle or joint pain.   SKIN: Denies skin rashes or itching.   ENDOCRINE: Denies excessive thirst. Denies intolerance to heat or cold.  PSYCHOSOCIAL: Denies depression or anxiety. Denies any recent memory loss.       Historical Information   Past  "Medical History:   Diagnosis Date    CSF leak 2021    Lactose intolerance Februay 2024    Pneumothorax on right 2015     Past Surgical History:   Procedure Laterality Date    DILATION AND CURETTAGE OF UTERUS      IR SPINE PROCEDURE  6/16/2021    MYOMECTOMY       Social History   Social History     Substance and Sexual Activity   Alcohol Use Yes    Alcohol/week: 2.0 standard drinks of alcohol    Types: 2 Glasses of wine per week    Comment: occasional     Social History     Substance and Sexual Activity   Drug Use Never     Social History     Tobacco Use   Smoking Status Never   Smokeless Tobacco Never     Family History   Problem Relation Age of Onset    Migraines Mother     Cirrhosis Father     No Known Problems Sister     No Known Problems Sister     Stomach cancer Brother     No Known Problems Brother        Meds/Allergies       Current Outpatient Medications:     polyethylene glycol (GOLYTELY) 4000 mL solution    No Known Allergies        Objective     Blood pressure 110/70, pulse 56, height 5' 4\" (1.626 m), weight 70.3 kg (155 lb), SpO2 98%. Body mass index is 26.61 kg/m².        PHYSICAL EXAM:      General Appearance:   Alert, cooperative, no distress   HEENT:   Normocephalic, atraumatic, anicteric.     Neck:  Supple, symmetrical, trachea midline   Lungs:   Clear to auscultation bilaterally; no rales, rhonchi or wheezing; respirations unlabored    Heart::   Regular rate and rhythm; no murmur, rub, or gallop.   Abdomen:   Soft, non-tender, non-distended; normal bowel sounds; no masses, no organomegaly    Genitalia:   Deferred    Rectal:   Deferred    Extremities:  No cyanosis, clubbing or edema    Pulses:  2+ and symmetric    Skin:  No jaundice, rashes, or lesions    Lymph nodes:  No palpable cervical lymphadenopathy        Lab Results:   No visits with results within 1 Day(s) from this visit.   Latest known visit with results is:   Appointment on 04/16/2024   Component Date Value    WBC 04/16/2024 7.20     RBC " 04/16/2024 3.99     Hemoglobin 04/16/2024 12.2     Hematocrit 04/16/2024 38.6     MCV 04/16/2024 97     MCH 04/16/2024 30.6     MCHC 04/16/2024 31.6     RDW 04/16/2024 13.0     MPV 04/16/2024 11.6     Platelets 04/16/2024 194     nRBC 04/16/2024 0     Segmented % 04/16/2024 58     Immature Grans % 04/16/2024 0     Lymphocytes % 04/16/2024 32     Monocytes % 04/16/2024 9     Eosinophils Relative 04/16/2024 1     Basophils Relative 04/16/2024 0     Absolute Neutrophils 04/16/2024 4.13     Absolute Immature Grans 04/16/2024 0.01     Absolute Lymphocytes 04/16/2024 2.33     Absolute Monocytes 04/16/2024 0.63     Eosinophils Absolute 04/16/2024 0.08     Basophils Absolute 04/16/2024 0.02     Sodium 04/16/2024 138     Potassium 04/16/2024 4.0     Chloride 04/16/2024 104     CO2 04/16/2024 28     ANION GAP 04/16/2024 6     BUN 04/16/2024 17     Creatinine 04/16/2024 0.66     Glucose, Fasting 04/16/2024 86     Calcium 04/16/2024 9.1     AST 04/16/2024 17     ALT 04/16/2024 14     Alkaline Phosphatase 04/16/2024 42     Total Protein 04/16/2024 7.7     Albumin 04/16/2024 4.3     Total Bilirubin 04/16/2024 0.67     eGFR 04/16/2024 106     Cholesterol 04/16/2024 198     Triglycerides 04/16/2024 53     HDL, Direct 04/16/2024 71     LDL Calculated 04/16/2024 116 (H)     TSH 3RD GENERATON 04/16/2024 0.730     Color, UA 04/16/2024 Light Yellow     Clarity, UA 04/16/2024 Clear     Specific Gravity, UA 04/16/2024 1.021     pH, UA 04/16/2024 6.0     Leukocytes, UA 04/16/2024 Negative     Nitrite, UA 04/16/2024 Negative     Protein, UA 04/16/2024 Trace (A)     Glucose, UA 04/16/2024 Negative     Ketones, UA 04/16/2024 Negative     Urobilinogen, UA 04/16/2024 <2.0     Bilirubin, UA 04/16/2024 Negative     Occult Blood, UA 04/16/2024 Negative     RBC, UA 04/16/2024 None Seen     WBC, UA 04/16/2024 None Seen     Epithelial Cells 04/16/2024 Occasional     Bacteria, UA 04/16/2024 None Seen          Radiology Results:   XR chest pa &  lateral    Result Date: 4/16/2024  Narrative: CHEST INDICATION:   Chest pain on breathing. COMPARISON:  None. EXAM PERFORMED/VIEWS:  XR CHEST PA & LATERAL FINDINGS: Cardiomediastinal silhouette appears unremarkable. Small right pleural effusion is present. Passive atelectasis at the base of right lower lobe and middle lobe. Multiple embolectomy coils are seen in the right lung base. Osseous structures appear within normal limits for patient age.     Impression: Small right pleural effusion or pleural thickening with some mild associated atelectasis at the right base. Multiple embolectomy coils also noted adjacent to the right lung base, likely the source of the pleural changes above. No airspace infiltrate. Clear left lung. Electronically signed: 04/16/2024 12:40 PM Daniel Taylor MD

## 2024-04-23 ENCOUNTER — HOSPITAL ENCOUNTER (OUTPATIENT)
Dept: ULTRASOUND IMAGING | Facility: CLINIC | Age: 49
Discharge: HOME | End: 2024-04-23
Payer: COMMERCIAL

## 2024-04-23 DIAGNOSIS — R31.0 GROSS HEMATURIA: ICD-10-CM

## 2024-04-23 PROCEDURE — 76775 US EXAM ABDO BACK WALL LIM: CPT

## 2024-04-24 ENCOUNTER — HOSPITAL ENCOUNTER (OUTPATIENT)
Dept: MAMMOGRAPHY | Facility: CLINIC | Age: 49
End: 2024-04-24
Payer: COMMERCIAL

## 2024-04-25 NOTE — PATIENT INSTRUCTIONS
Scheduled date of colonoscopy (as of today): 5/28/24  Physician performing colonoscopy: Hector  Location of colonoscopy: [facility]  Bowel prep reviewed with patient: Miralax  Instructions reviewed with patient by: Aileen OLIVARES  Clearances:

## 2024-04-29 ENCOUNTER — TELEPHONE (OUTPATIENT)
Age: 49
End: 2024-04-29

## 2024-04-29 DIAGNOSIS — D25.9 UTERINE LEIOMYOMA, UNSPECIFIED LOCATION: Primary | ICD-10-CM

## 2024-05-21 ENCOUNTER — PREP FOR PROCEDURE (OUTPATIENT)
Age: 49
End: 2024-05-21

## 2024-05-24 ENCOUNTER — HOSPITAL ENCOUNTER (OUTPATIENT)
Dept: MAMMOGRAPHY | Facility: CLINIC | Age: 49
End: 2024-05-24
Payer: COMMERCIAL

## 2024-05-24 DIAGNOSIS — R92.8 ABNORMAL MAMMOGRAM: ICD-10-CM

## 2024-05-24 DIAGNOSIS — Z12.31 ENCOUNTER FOR SCREENING MAMMOGRAM FOR MALIGNANT NEOPLASM OF BREAST: ICD-10-CM

## 2024-05-24 PROCEDURE — 77066 DX MAMMO INCL CAD BI: CPT

## 2024-05-24 PROCEDURE — G0279 TOMOSYNTHESIS, MAMMO: HCPCS

## 2024-05-28 ENCOUNTER — ANESTHESIA (OUTPATIENT)
Dept: GASTROENTEROLOGY | Facility: HOSPITAL | Age: 49
End: 2024-05-28

## 2024-05-28 ENCOUNTER — ANESTHESIA EVENT (OUTPATIENT)
Dept: GASTROENTEROLOGY | Facility: HOSPITAL | Age: 49
End: 2024-05-28

## 2024-05-28 ENCOUNTER — HOSPITAL ENCOUNTER (OUTPATIENT)
Dept: GASTROENTEROLOGY | Facility: HOSPITAL | Age: 49
Setting detail: OUTPATIENT SURGERY
Discharge: HOME | End: 2024-05-28
Admitting: INTERNAL MEDICINE
Payer: COMMERCIAL

## 2024-05-28 VITALS
HEIGHT: 65 IN | SYSTOLIC BLOOD PRESSURE: 114 MMHG | BODY MASS INDEX: 25.38 KG/M2 | RESPIRATION RATE: 12 BRPM | OXYGEN SATURATION: 100 % | HEART RATE: 55 BPM | DIASTOLIC BLOOD PRESSURE: 57 MMHG | WEIGHT: 152.34 LBS | TEMPERATURE: 97 F

## 2024-05-28 DIAGNOSIS — Z12.11 SCREENING FOR COLON CANCER: ICD-10-CM

## 2024-05-28 LAB
EXT PREGNANCY TEST URINE: NEGATIVE
EXT. CONTROL: NORMAL

## 2024-05-28 PROCEDURE — G0121 COLON CA SCRN NOT HI RSK IND: HCPCS | Performed by: INTERNAL MEDICINE

## 2024-05-28 PROCEDURE — 81025 URINE PREGNANCY TEST: CPT | Performed by: STUDENT IN AN ORGANIZED HEALTH CARE EDUCATION/TRAINING PROGRAM

## 2024-05-28 RX ORDER — SODIUM CHLORIDE, SODIUM LACTATE, POTASSIUM CHLORIDE, CALCIUM CHLORIDE 600; 310; 30; 20 MG/100ML; MG/100ML; MG/100ML; MG/100ML
INJECTION, SOLUTION INTRAVENOUS CONTINUOUS PRN
Status: DISCONTINUED | OUTPATIENT
Start: 2024-05-28 | End: 2024-05-28

## 2024-05-28 RX ORDER — LIDOCAINE HYDROCHLORIDE 10 MG/ML
INJECTION, SOLUTION EPIDURAL; INFILTRATION; INTRACAUDAL; PERINEURAL AS NEEDED
Status: DISCONTINUED | OUTPATIENT
Start: 2024-05-28 | End: 2024-05-28

## 2024-05-28 RX ORDER — PROPOFOL 10 MG/ML
INJECTION, EMULSION INTRAVENOUS AS NEEDED
Status: DISCONTINUED | OUTPATIENT
Start: 2024-05-28 | End: 2024-05-28

## 2024-05-28 RX ADMIN — LIDOCAINE HYDROCHLORIDE 50 MG: 10 INJECTION, SOLUTION EPIDURAL; INFILTRATION; INTRACAUDAL; PERINEURAL at 08:51

## 2024-05-28 RX ADMIN — SODIUM CHLORIDE, SODIUM LACTATE, POTASSIUM CHLORIDE, AND CALCIUM CHLORIDE: .6; .31; .03; .02 INJECTION, SOLUTION INTRAVENOUS at 08:35

## 2024-05-28 RX ADMIN — PROPOFOL 100 MG: 10 INJECTION, EMULSION INTRAVENOUS at 08:53

## 2024-05-28 RX ADMIN — PROPOFOL 40 MG: 10 INJECTION, EMULSION INTRAVENOUS at 08:56

## 2024-05-28 NOTE — ANESTHESIA PREPROCEDURE EVALUATION
Procedure:  COLONOSCOPY    Relevant Problems   ANESTHESIA (within normal limits)      CARDIO   (+) Migraine without aura and without status migrainosus, not intractable   (-) MI (myocardial infarction) (HCC)      ENDO (within normal limits)      GI/HEPATIC (within normal limits)  NPO confirmed  BMI 25.4      /RENAL (within normal limits)      HEMATOLOGY (within normal limits)      NEURO/PSYCH   (+) Migraine without aura and without status migrainosus, not intractable   (-) CVA (cerebral vascular accident) (HCC)   (-) Seizures (HCC)      PULMONARY (within normal limits)   (-) Sleep apnea   (-) URI (upper respiratory infection)      No Known Allergies  Social History     Tobacco Use    Smoking status: Never    Smokeless tobacco: Never   Vaping Use    Vaping status: Never Used   Substance Use Topics    Alcohol use: Yes     Alcohol/week: 2.0 standard drinks of alcohol     Types: 2 Glasses of wine per week     Comment: social    Drug use: Never     Current Outpatient Medications   Medication Instructions    polyethylene glycol (GOLYTELY) 4000 mL solution 4,000 mL, Oral, Once     Lab Results   Component Value Date    WBC 7.20 04/16/2024    HGB 12.2 04/16/2024    HCT 38.6 04/16/2024     04/16/2024    SODIUM 138 04/16/2024    K 4.0 04/16/2024     04/16/2024    CO2 28 04/16/2024    BUN 17 04/16/2024    CREATININE 0.66 04/16/2024    GLUC 90 03/02/2023    HGBA1C 5.1 06/10/2021    AST 17 04/16/2024    ALT 14 04/16/2024    ALKPHOS 42 04/16/2024    TBILI 0.67 04/16/2024    ALB 4.3 04/16/2024    PROTIME 13.1 06/10/2021    PTT 30 06/10/2021    INR 0.99 06/10/2021     Vitals:    05/28/24 0805   BP: 116/56   Pulse: 78   Resp: 20   Temp: (!) 97 °F (36.1 °C)   SpO2: 100%     EKG 3/2/23  Normal sinus rhythm  Possible Left atrial enlargement  No previous ECGs available  Confirmed by Radha Mays ([id number]) on 3/2/2023 2:43:52 PM    Physical Exam    Airway    Mallampati score: II  TM Distance: >3 FB  Neck ROM: full      Dental   Comment: Denies loose/chipped teeth, No notable dental hx     Cardiovascular  Rhythm: regular, Rate: normal, Cardiovascular exam normal    Pulmonary  Pulmonary exam normal Breath sounds clear to auscultation    Other Findings  post-pubertal.      Anesthesia Plan  ASA Score- 1     Anesthesia Type- IV sedation with anesthesia with ASA Monitors.         Additional Monitors:     Airway Plan:     Comment: O2 mask, natural airway, EtCO2 monitor. Risks discussed including awareness, aspiration, drug reactions and conversion to GA..       Plan Factors-Exercise tolerance (METS): >4 METS.    Chart reviewed. EKG reviewed.  Existing labs reviewed. Patient summary reviewed.    Patient is not a current smoker.              Induction- intravenous.    Postoperative Plan-         Informed Consent- Anesthetic plan and risks discussed with patient.  I personally reviewed this patient with the CRNA. Discussed and agreed on the Anesthesia Plan with the CRNA..

## 2024-05-28 NOTE — ANESTHESIA POSTPROCEDURE EVALUATION
Post-Op Assessment Note    CV Status:  Stable  Pain Score: 0    Pain management: adequate       Mental Status:  Alert and awake   Hydration Status:  Stable   PONV Controlled:  None   Airway Patency:  Patent and adequate     Post Op Vitals Reviewed: Yes    No anethesia notable event occurred.    Staff: Anesthesiologist, CRNA

## 2024-05-28 NOTE — INTERVAL H&P NOTE
H&P reviewed. After examining the patient I find no changes in the patients condition since the H&P had been written.    Vitals:    05/28/24 0805   BP: 116/56   Pulse: 78   Resp: 20   Temp: (!) 97 °F (36.1 °C)   SpO2: 100%

## 2024-05-28 NOTE — H&P
History and Physical - [facility]  Melissa Palacios 48 y.o. female MRN: [id number]                  HPI: Melissa Palacios is a 48 y.o. year old female who presents for initial average risk screening colonoscopy.  No prior colonoscopy.      REVIEW OF SYSTEMS: Per the HPI, and otherwise unremarkable.    Historical Information   Past Medical History:   Diagnosis Date    CSF leak 2021    Lactose intolerance Februay 2024    Pneumothorax on right 2015     Past Surgical History:   Procedure Laterality Date    DILATION AND CURETTAGE OF UTERUS      IR SPINE PROCEDURE  6/16/2021    MYOMECTOMY       Social History   Social History     Substance and Sexual Activity   Alcohol Use Yes    Alcohol/week: 2.0 standard drinks of alcohol    Types: 2 Glasses of wine per week    Comment: occasional     Social History     Substance and Sexual Activity   Drug Use Never     Social History     Tobacco Use   Smoking Status Never   Smokeless Tobacco Never     Family History   Problem Relation Age of Onset    Migraines Mother     Cirrhosis Father     No Known Problems Sister     No Known Problems Sister     Stomach cancer Brother     No Known Problems Brother        Meds/Allergies     Not in a hospital admission.    No Known Allergies    Objective     There were no vitals taken for this visit.      PHYSICAL EXAM    Gen: NAD  CV: RRR  CHEST: Clear  ABD: soft, NT/ND  EXT: no edema  Neuro: AAO      ASSESSMENT/PLAN:  This is a 48 y.o. year old female here for initial average risk screening    PLAN:   Procedure: Colonoscopy

## 2024-05-29 ENCOUNTER — APPOINTMENT (EMERGENCY)
Dept: CT IMAGING | Facility: HOSPITAL | Age: 49
End: 2024-05-29
Payer: COMMERCIAL

## 2024-05-29 ENCOUNTER — HOSPITAL ENCOUNTER (EMERGENCY)
Facility: HOSPITAL | Age: 49
Discharge: HOME | End: 2024-05-30
Attending: EMERGENCY MEDICINE
Payer: COMMERCIAL

## 2024-05-29 ENCOUNTER — APPOINTMENT (EMERGENCY)
Dept: RADIOLOGY | Facility: HOSPITAL | Age: 49
End: 2024-05-29
Payer: COMMERCIAL

## 2024-05-29 DIAGNOSIS — S16.1XXA ACUTE STRAIN OF NECK MUSCLE, INITIAL ENCOUNTER: Primary | ICD-10-CM

## 2024-05-29 LAB
ALBUMIN SERPL BCP-MCNC: 4.3 G/DL (ref 3.5–5)
ALP SERPL-CCNC: 49 U/L (ref 34–104)
ALT SERPL W P-5'-P-CCNC: 12 U/L (ref 7–52)
ANION GAP SERPL CALCULATED.3IONS-SCNC: 7 MMOL/L (ref 4–13)
AST SERPL W P-5'-P-CCNC: 14 U/L (ref 13–39)
BASOPHILS # BLD AUTO: 0.04 THOUSANDS/ÂΜL (ref 0–0.1)
BASOPHILS NFR BLD AUTO: 0 % (ref 0–1)
BILIRUB SERPL-MCNC: 0.58 MG/DL (ref 0.2–1)
BUN SERPL-MCNC: 11 MG/DL (ref 5–25)
CALCIUM SERPL-MCNC: 9.3 MG/DL (ref 8.4–10.2)
CARDIAC TROPONIN I PNL SERPL HS: 4 NG/L
CHLORIDE SERPL-SCNC: 105 MMOL/L (ref 96–108)
CO2 SERPL-SCNC: 26 MMOL/L (ref 21–32)
CREAT SERPL-MCNC: 0.73 MG/DL (ref 0.6–1.3)
D DIMER PPP FEU-MCNC: <0.27 UG/ML FEU
EOSINOPHIL # BLD AUTO: 0.05 THOUSAND/ÂΜL (ref 0–0.61)
EOSINOPHIL NFR BLD AUTO: 0 % (ref 0–6)
ERYTHROCYTE [DISTWIDTH] IN BLOOD BY AUTOMATED COUNT: 13.2 % (ref 11.6–15.1)
GFR SERPL CREATININE-BSD FRML MDRD: 99 ML/MIN/1.73SQ M
GLUCOSE SERPL-MCNC: 103 MG/DL (ref 65–140)
HCG SERPL QL: NEGATIVE
HCT VFR BLD AUTO: 34.6 % (ref 34.8–46.1)
HGB BLD-MCNC: 11.4 G/DL (ref 11.5–15.4)
IMM GRANULOCYTES # BLD AUTO: 0.05 THOUSAND/UL (ref 0–0.2)
IMM GRANULOCYTES NFR BLD AUTO: 0 % (ref 0–2)
LIPASE SERPL-CCNC: 10 U/L (ref 11–82)
LYMPHOCYTES # BLD AUTO: 1.83 THOUSANDS/ÂΜL (ref 0.6–4.47)
LYMPHOCYTES NFR BLD AUTO: 14 % (ref 14–44)
MCH RBC QN AUTO: 31.1 PG (ref 26.8–34.3)
MCHC RBC AUTO-ENTMCNC: 32.9 G/DL (ref 31.4–37.4)
MCV RBC AUTO: 95 FL (ref 82–98)
MONOCYTES # BLD AUTO: 1.08 THOUSAND/ÂΜL (ref 0.17–1.22)
MONOCYTES NFR BLD AUTO: 8 % (ref 4–12)
NEUTROPHILS # BLD AUTO: 10.42 THOUSANDS/ÂΜL (ref 1.85–7.62)
NEUTS SEG NFR BLD AUTO: 78 % (ref 43–75)
NRBC BLD AUTO-RTO: 0 /100 WBCS
PLATELET # BLD AUTO: 211 THOUSANDS/UL (ref 149–390)
PMV BLD AUTO: 10.4 FL (ref 8.9–12.7)
POTASSIUM SERPL-SCNC: 3.7 MMOL/L (ref 3.5–5.3)
PROT SERPL-MCNC: 8.1 G/DL (ref 6.4–8.4)
RBC # BLD AUTO: 3.66 MILLION/UL (ref 3.81–5.12)
SODIUM SERPL-SCNC: 138 MMOL/L (ref 135–147)
WBC # BLD AUTO: 13.47 THOUSAND/UL (ref 4.31–10.16)

## 2024-05-29 PROCEDURE — 85379 FIBRIN DEGRADATION QUANT: CPT | Performed by: EMERGENCY MEDICINE

## 2024-05-29 PROCEDURE — 71046 X-RAY EXAM CHEST 2 VIEWS: CPT

## 2024-05-29 PROCEDURE — 80053 COMPREHEN METABOLIC PANEL: CPT | Performed by: EMERGENCY MEDICINE

## 2024-05-29 PROCEDURE — 85025 COMPLETE CBC W/AUTO DIFF WBC: CPT | Performed by: EMERGENCY MEDICINE

## 2024-05-29 PROCEDURE — 84703 CHORIONIC GONADOTROPIN ASSAY: CPT | Performed by: EMERGENCY MEDICINE

## 2024-05-29 PROCEDURE — 70491 CT SOFT TISSUE NECK W/DYE: CPT

## 2024-05-29 PROCEDURE — 93005 ELECTROCARDIOGRAM TRACING: CPT

## 2024-05-29 PROCEDURE — 84484 ASSAY OF TROPONIN QUANT: CPT | Performed by: EMERGENCY MEDICINE

## 2024-05-29 PROCEDURE — 83690 ASSAY OF LIPASE: CPT | Performed by: EMERGENCY MEDICINE

## 2024-05-29 PROCEDURE — 36415 COLL VENOUS BLD VENIPUNCTURE: CPT | Performed by: EMERGENCY MEDICINE

## 2024-05-29 PROCEDURE — 99285 EMERGENCY DEPT VISIT HI MDM: CPT | Performed by: EMERGENCY MEDICINE

## 2024-05-29 PROCEDURE — 96375 TX/PRO/DX INJ NEW DRUG ADDON: CPT

## 2024-05-29 PROCEDURE — 99284 EMERGENCY DEPT VISIT MOD MDM: CPT

## 2024-05-29 RX ORDER — KETOROLAC TROMETHAMINE 30 MG/ML
15 INJECTION, SOLUTION INTRAMUSCULAR; INTRAVENOUS ONCE
Status: COMPLETED | OUTPATIENT
Start: 2024-05-29 | End: 2024-05-29

## 2024-05-29 RX ORDER — ACETAMINOPHEN 10 MG/ML
1000 INJECTION, SOLUTION INTRAVENOUS ONCE
Status: COMPLETED | OUTPATIENT
Start: 2024-05-29 | End: 2024-05-30

## 2024-05-29 RX ADMIN — KETOROLAC TROMETHAMINE 15 MG: 30 INJECTION, SOLUTION INTRAMUSCULAR; INTRAVENOUS at 22:16

## 2024-05-29 RX ADMIN — IOHEXOL 100 ML: 350 INJECTION, SOLUTION INTRAVENOUS at 23:11

## 2024-05-30 VITALS
RESPIRATION RATE: 18 BRPM | OXYGEN SATURATION: 100 % | TEMPERATURE: 98.8 F | HEART RATE: 76 BPM | DIASTOLIC BLOOD PRESSURE: 53 MMHG | SYSTOLIC BLOOD PRESSURE: 108 MMHG

## 2024-05-30 LAB
2HR DELTA HS TROPONIN: 0 NG/L
ATRIAL RATE: 76 BPM
CARDIAC TROPONIN I PNL SERPL HS: 4 NG/L
P AXIS: 146 DEGREES
PR INTERVAL: 142 MS
QRS AXIS: 128 DEGREES
QRSD INTERVAL: 88 MS
QT INTERVAL: 370 MS
QTC INTERVAL: 416 MS
T WAVE AXIS: 104 DEGREES
VENTRICULAR RATE: 76 BPM

## 2024-05-30 PROCEDURE — 96366 THER/PROPH/DIAG IV INF ADDON: CPT

## 2024-05-30 PROCEDURE — 96365 THER/PROPH/DIAG IV INF INIT: CPT

## 2024-05-30 PROCEDURE — 36415 COLL VENOUS BLD VENIPUNCTURE: CPT | Performed by: EMERGENCY MEDICINE

## 2024-05-30 PROCEDURE — 93010 ELECTROCARDIOGRAM REPORT: CPT | Performed by: INTERNAL MEDICINE

## 2024-05-30 PROCEDURE — 84484 ASSAY OF TROPONIN QUANT: CPT | Performed by: EMERGENCY MEDICINE

## 2024-05-30 RX ORDER — METHOCARBAMOL 750 MG/1
750 TABLET, FILM COATED ORAL 2 TIMES DAILY PRN
Qty: 14 TABLET | Refills: 0 | Status: SHIPPED | OUTPATIENT
Start: 2024-05-30 | End: 2024-06-06

## 2024-05-30 RX ADMIN — ACETAMINOPHEN 1000 MG: 10 INJECTION INTRAVENOUS at 00:14

## 2024-05-30 NOTE — ED PROVIDER NOTES
Pt Name: Melissa Palacios  MRN: [id number]  Birthdate 1975  Age/Sex: 48 y.o. female  Date of evaluation: 5/29/2024  PCP: JOSE Villalta    CHIEF COMPLAINT    Chief Complaint   Patient presents with    Neck Pain     States after her colonoscopy yesterday, neck stiffness and pain, states she is unable to swallow, but forced herself to eat a salad today.          HPI and MDM    48 y.o. female presenting with right-sided neck pain, ear pain, numbness anterior to the right ear.  Pain with moving neck in all directions aside from moving it down.  Pain with swallowing.  She had a colonoscopy done yesterday, states the pain started as the day went on afterwards.  Pain also with inspiration.  Mentions history of spontaneous pneumothorax 9 years ago.  Does have some right upper chest pain.  No vomiting.  No abdominal pain.  No blood in stool.  No fevers.      Differential diagnosis considered includes but not limited to ***            Medications   ketorolac (TORADOL) injection 15 mg (has no administration in time range)         Past Medical and Surgical History    Past Medical History:   Diagnosis Date    CSF leak 2021    Lactose intolerance Februay 2024    Pneumothorax on right 2015       Past Surgical History:   Procedure Laterality Date    DILATION AND CURETTAGE OF UTERUS      IR SPINE PROCEDURE  6/16/2021    MYOMECTOMY         Family History   Problem Relation Age of Onset    Migraines Mother     Cirrhosis Father     No Known Problems Sister     No Known Problems Sister     Stomach cancer Brother     No Known Problems Brother        Social History     Tobacco Use    Smoking status: Never    Smokeless tobacco: Never   Vaping Use    Vaping status: Never Used   Substance Use Topics    Alcohol use: Yes     Alcohol/week: 2.0 standard drinks of alcohol     Types: 2 Glasses of wine per week     Comment: social    Drug use: Never           Allergies    No Known Allergies    Home Medications    Prior to Admission  medications    Medication Sig Start Date End Date Taking? Authorizing Provider   polyethylene glycol (GOLYTELY) 4000 mL solution Take 4,000 mL by mouth once for 1 dose 4/22/24 4/22/24  Lynette Cesar PA-C           Physical Exam      ED Triage Vitals [05/29/24 2105]   Temperature Pulse Respirations Blood Pressure SpO2   98.8 °F (37.1 °C) 87 18 130/98 100 %      Temp Source Heart Rate Source Patient Position - Orthostatic VS BP Location FiO2 (%)   Oral Monitor Sitting Left arm --      Pain Score       --               Physical Exam  HENT:      Head: Normocephalic and atraumatic.      Right Ear: Tympanic membrane, ear canal and external ear normal.      Left Ear: External ear normal.      Mouth/Throat:      Mouth: Mucous membranes are moist.   Eyes:      Conjunctiva/sclera: Conjunctivae normal.      Pupils: Pupils are equal, round, and reactive to light.   Cardiovascular:      Rate and Rhythm: Normal rate and regular rhythm.   Abdominal:      Palpations: Abdomen is soft.   Musculoskeletal:         General: No deformity. Normal range of motion.   Skin:     General: Skin is warm.      Findings: No erythema.   Neurological:      Mental Status: She is alert and oriented to person, place, and time. Mental status is at baseline.              Diagnostic Results      Labs:    Results Reviewed       Procedure Component Value Units Date/Time    CBC and differential [[id number]]     Lab Status: No result Specimen: Blood     Comprehensive metabolic panel [[id number]]     Lab Status: No result Specimen: Blood     D-Dimer [[id number]]     Lab Status: No result Specimen: Blood     HS Troponin 0hr (reflex protocol) [[id number]]     Lab Status: No result Specimen: Blood     hCG, qualitative pregnancy [[id number]]     Lab Status: No result Specimen: Blood     Lipase [[id number]]     Lab Status: No result Specimen: Blood             All labs reviewed and utilized in the medical decision making process    Radiology:    XR chest 2 views     (Results Pending)       All radiology studies independently viewed by me and interpreted by the radiologist.    Procedure    Procedures        FINAL IMPRESSION    Final diagnoses:   None         DISPOSITION    ED Disposition       None          Follow-up Information    None           PATIENT REFERRED TO:    No follow-up provider specified.    DISCHARGE MEDICATIONS:    Patient's Medications   Discharge Prescriptions    No medications on file       No discharge procedures on file.         Filiberto Lozano, DO        This note was partially completed using voice recognition technology, and was scanned for gross errors; however some errors may still exist. Please contact the author with any questions or requests for clarification.    Narrative:      National Kidney Disease Foundation guidelines for Chronic Kidney Disease (CKD):     Stage 1 with normal or high GFR (GFR > 90 mL/min/1.73 square meters)    Stage 2 Mild CKD (GFR = 60-89 mL/min/1.73 square meters)    Stage 3A Moderate CKD (GFR = 45-59 mL/min/1.73 square meters)    Stage 3B Moderate CKD (GFR = 30-44 mL/min/1.73 square meters)    Stage 4 Severe CKD (GFR = 15-29 mL/min/1.73 square meters)    Stage 5 End Stage CKD (GFR <15 mL/min/1.73 square meters)  Note: GFR calculation is accurate only with a steady state creatinine    Lipase [[id number]]  (Abnormal) Collected: 05/29/24 2213    Lab Status: Final result Specimen: Blood from Arm, Left Updated: 05/29/24 2246     Lipase 10 u/L     CBC and differential [[id number]]  (Abnormal) Collected: 05/29/24 2213    Lab Status: Final result Specimen: Blood from Arm, Left Updated: 05/29/24 2225     WBC 13.47 Thousand/uL      RBC 3.66 Million/uL      Hemoglobin 11.4 g/dL      Hematocrit 34.6 %      MCV 95 fL      MCH 31.1 pg      MCHC 32.9 g/dL      RDW 13.2 %      MPV 10.4 fL      Platelets 211 Thousands/uL      nRBC 0 /100 WBCs      Segmented % 78 %      Immature Grans % 0 %      Lymphocytes % 14 %      Monocytes % 8 %      Eosinophils Relative 0 %      Basophils Relative 0 %      Absolute Neutrophils 10.42 Thousands/µL      Absolute Immature Grans 0.05 Thousand/uL      Absolute Lymphocytes 1.83 Thousands/µL      Absolute Monocytes 1.08 Thousand/µL      Eosinophils Absolute 0.05 Thousand/µL      Basophils Absolute 0.04 Thousands/µL             All labs reviewed and utilized in the medical decision making process    Radiology:    CT soft tissue neck with contrast   Final Result      No acute process seen.      Clinical follow-up recommended.            Workstation performed: [id number]         XR chest 2 views   Final Result      No change from 4/16/2024. No acute pulmonary disease            Workstation performed: [id number]             All radiology  studies independently viewed by me and interpreted by the radiologist.    Procedure    Procedures        FINAL IMPRESSION    Final diagnoses:   Acute strain of neck muscle, initial encounter         DISPOSITION    Time reflects when diagnosis was documented in both MDM as applicable and the Disposition within this note       Time User Action Codes Description Comment    5/30/2024  2:13 AM Filiberto Lozano Add [S16.1XXA] Acute strain of neck muscle, initial encounter           ED Disposition       ED Disposition   Discharge    Condition   Stable    Date/Time   Thu May 30, 2024  2:13 AM    Comment   Melissa Palacios discharge to home/self care.                   Follow-up Information       Follow up With Specialties Details Why Contact Info Additional Information    [facility], Nurse Practitioner Call in 1 day  [address]  [facility] [address] [phone]       [facility] Emergency Medicine Go to  If symptoms worsen, As needed [address] [facility] [address] [phone] [facility], [address] [place] [address], [id number]              PATIENT REFERRED TO:    JOSE Villalta  [address]  [facility] [address] [phone]    Call in 1 day      [facility]  [address] [place] [id number] [phone]  Go to   If symptoms worsen, As needed      DISCHARGE MEDICATIONS:    Discharge Medication List as of 5/30/2024  2:14 AM        START taking these medications    Details   methocarbamol (ROBAXIN) 750 mg tablet Take 1 tablet (750 mg total) by mouth 2 (two) times a day as needed for muscle spasms for up to 7 days, Starting Thu 5/30/2024, Until Thu 6/6/2024 at 2359, Normal           CONTINUE these medications which have NOT CHANGED    Details   polyethylene glycol (GOLYTELY) 4000 mL solution  Take 4,000 mL by mouth once for 1 dose, Starting Mon 4/22/2024, Normal             No discharge procedures on file.         Filiberto Lozano DO        This note was partially completed using voice recognition technology, and was scanned for gross errors; however some errors may still exist. Please contact the author with any questions or requests for clarification.      Filiberto Lozano DO  06/03/24 0709

## 2024-05-30 NOTE — DISCHARGE INSTRUCTIONS
Do NOT drive or operate heavy machinery while taking muscle relaxers.    Take tylenol and ibuprofen for pain as well. Use heating pad.    Return to the emergency department for any new or worsening symptoms.

## 2024-07-10 ENCOUNTER — HOSPITAL ENCOUNTER (OUTPATIENT)
Dept: ULTRASOUND IMAGING | Facility: CLINIC | Age: 49
Discharge: HOME | End: 2024-07-10
Payer: COMMERCIAL

## 2024-07-10 DIAGNOSIS — D25.9 UTERINE LEIOMYOMA, UNSPECIFIED LOCATION: ICD-10-CM

## 2024-07-10 PROCEDURE — 76830 TRANSVAGINAL US NON-OB: CPT

## 2024-07-10 PROCEDURE — 76856 US EXAM PELVIC COMPLETE: CPT

## 2024-07-11 ENCOUNTER — TELEPHONE (OUTPATIENT)
Age: 49
End: 2024-07-11

## 2024-07-11 NOTE — TELEPHONE ENCOUNTER
Results: Please call patient to make aware that her ultrasound showed a possible endometrial polyp as well as uterine fibroids.  I would like her to be seen by gynecology.  Thank you               Relayed results to patient as per provider message. Patient  expressed understanding and did not have any further questions.                                                              Please complete Result Management task.

## 2024-08-13 ENCOUNTER — CONSULT (OUTPATIENT)
Age: 49
End: 2024-08-13
Payer: COMMERCIAL

## 2024-08-13 VITALS
TEMPERATURE: 98 F | WEIGHT: 156 LBS | HEIGHT: 65 IN | SYSTOLIC BLOOD PRESSURE: 104 MMHG | BODY MASS INDEX: 25.99 KG/M2 | DIASTOLIC BLOOD PRESSURE: 68 MMHG | HEART RATE: 68 BPM

## 2024-08-13 DIAGNOSIS — Z12.31 SCREENING MAMMOGRAM FOR BREAST CANCER: ICD-10-CM

## 2024-08-13 DIAGNOSIS — Z01.419 ENCOUNTER FOR GYNECOLOGICAL EXAMINATION WITHOUT ABNORMAL FINDING: Primary | ICD-10-CM

## 2024-08-13 DIAGNOSIS — R30.0 DYSURIA: ICD-10-CM

## 2024-08-13 DIAGNOSIS — N84.0 ENDOMETRIAL POLYP: ICD-10-CM

## 2024-08-13 DIAGNOSIS — D25.9 UTERINE LEIOMYOMA, UNSPECIFIED LOCATION: ICD-10-CM

## 2024-08-13 PROBLEM — G89.29 CHRONIC RIGHT-SIDED LOW BACK PAIN WITH SCIATICA: Status: ACTIVE | Noted: 2017-06-22

## 2024-08-13 PROBLEM — Z00.00 ANNUAL PHYSICAL EXAM: Status: RESOLVED | Noted: 2024-03-25 | Resolved: 2024-08-13

## 2024-08-13 PROBLEM — M54.40 CHRONIC RIGHT-SIDED LOW BACK PAIN WITH SCIATICA: Status: ACTIVE | Noted: 2017-06-22

## 2024-08-13 PROBLEM — R31.0 GROSS HEMATURIA: Status: RESOLVED | Noted: 2024-03-25 | Resolved: 2024-08-13

## 2024-08-13 PROBLEM — R19.5 CHANGE IN STOOL: Status: RESOLVED | Noted: 2024-03-25 | Resolved: 2024-08-13

## 2024-08-13 PROCEDURE — S0610 ANNUAL GYNECOLOGICAL EXAMINA: HCPCS | Performed by: NURSE PRACTITIONER

## 2024-08-13 PROCEDURE — 87086 URINE CULTURE/COLONY COUNT: CPT | Performed by: NURSE PRACTITIONER

## 2024-08-13 NOTE — PROGRESS NOTES
Diagnoses and all orders for this visit:    Encounter for gynecological examination without abnormal finding    Uterine leiomyoma, unspecified location  -     Ambulatory Referral to Obstetrics / Gynecology    Endometrial polyp  -     US pelvis complete w transvaginal; Future    Dysuria  Comments:  Urine cx ordered.    Screening mammogram for breast cancer  Comments:  scheduled    Calcium/vit d inclusion in the diet discussed, call with any issues, SBE reinforced, all concerns addressed.           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 She denies any issues with heavy bleeding or her menses. She reports regular monthly, every 28 day cycles that last 4-5 days.  She has a history of fibroids and a 5 mm endometrial polyp seen on pelvic ultrasound 7/2024. History of prior myomectomies x 2.  She reports no issues with her menstrual cycles, they are not heavy.  She agrees to repeat a pelvic ultrasound in 6 months to check on the polyp. She denies history of abnormal pap smears. Last Pap 02/23/2023 neg. She reports multiple normal Paps in [place]. A pap was NOT done today.  ASCCP guidelines were reviewed. She denies vaginal issues. She denies pelvic pain. She denies dyspareunia. She is NOT sexually active for the past 1.5 years,  is away. Last mammogram was done on 05/24/2024 BR 3 normal. She is getting 6-month follow-ups for stable calcifications.  Colonoscopy done in May 2024, due for repeat in 10 years.  She reports a slight burning sensation at the end of urination for the past few weeks.  She does agree it may be from dehydration.  A urine culture was sent today.    Past Medical History:   Diagnosis Date    CSF leak 2021    Lactose intolerance Februay 2024    Miscarriage 2017 & 2018    Pneumothorax on right 2015     Past Surgical History:   Procedure Laterality Date    DILATION AND CURETTAGE OF UTERUS      IR SPINE PROCEDURE  6/16/2021    MYOMECTOMY       Family History   Problem  "Relation Age of Onset    Migraines Mother     Cirrhosis Father     No Known Problems Sister     No Known Problems Sister     Stomach cancer Brother     No Known Problems Brother      Social History     Tobacco Use    Smoking status: Never    Smokeless tobacco: Never   Vaping Use    Vaping status: Never Used   Substance Use Topics    Alcohol use: Yes     Alcohol/week: 2.0 standard drinks of alcohol     Types: 2 Glasses of wine per week     Comment: social    Drug use: Never     No current outpatient medications on file.  Patient Active Problem List    Diagnosis Date Noted    Chest pain on breathing 2024    Spontaneous intracranial hypotension 2021    Migraine without aura and without status migrainosus, not intractable 2021    Chronic right-sided low back pain with sciatica 2017       No Known Allergies    OB History    Para Term  AB Living   3 0 0 0 3 0   SAB IAB Ectopic Multiple Live Births   2 0 0 0 0      # Outcome Date GA Lbr Neto/2nd Weight Sex Type Anes PTL Lv   3 AB            2 SAB            1 SAB              Works remotely for [employer]    Vitals:    24 0947   BP: 104/68   Pulse: 68   Temp: 98 °F (36.7 °C)   TempSrc: Oral   Weight: 70.8 kg (156 lb)   Height: 5' 5\" (1.651 m)     Body mass index is 25.96 kg/m².  Patient's last menstrual period was 2024.    Review of Systems   Constitutional: Negative for chills, fatigue, fever and unexpected weight change.   Respiratory: Positive for shortness of breath since her spontaneous.    Gastrointestinal: Negative for anal bleeding, blood in stool, constipation and diarrhea.   Genitourinary: Negative for difficulty urinating, dysuria and hematuria.     Physical Exam   Constitutional: She appears well-developed and well-nourished. No distress.   HENT: atraumatic, EOMI  Head: Normocephalic.   Neck: Normal range of motion. Neck supple.   Pulmonary: Effort normal.  Breasts: bilateral without masses, skin changes or nipple " discharge. Bilaterally soft and warm to touch. No areas of erythema or pain.  Abdominal: Soft.   Pelvic exam was performed with patient supine. No labial fusion. There is no rash, tenderness, lesion or injury on the right labia. There is no rash, tenderness, lesion or injury on the left labia. Urethral meatus does not show any tenderness, inflammation or discharge. Palpation of midline bladder without pain or discomfort.Uterus is not deviated, not enlarged, not fixed and not tender. Cervix exhibits no motion tenderness, no discharge and no friability. Right adnexum displays no mass, no tenderness and no fullness. Left adnexum displays no mass, no tenderness and no fullness. No erythema or tenderness in the vagina. No foreign body in the vagina. No signs of injury around the vagina. No vaginal discharge found. No signs of injury around the vagina or anus. Perineum without lesions, signs of injury, erythema or swelling.  Lymphadenopathy:        Right: No inguinal adenopathy present.        Left: No inguinal adenopathy present.

## 2024-08-13 NOTE — PATIENT INSTRUCTIONS
Patient Education     Lowering Your Risk of Breast Cancer   About this topic   Breast cancer is a serious illness. Breast cancer is when abnormal cells grow and divide more quickly in your breast. These cells form a growth or tumor. The abnormal cells may enter nearby tissue and spread to other parts of the body. It is the type of cancer most often seen in women. Men can have breast cancer, but it is a rare condition.  General   Some things in your life may increase your risk of breast cancer. You may not be able to change some of these. Others you can control.  You are more likely to get breast cancer if you:  Have a mother, sister, or daughter who has had breast cancer  Have used hormones for menopause for more than 5 years  Have had radiation therapy to the breast or chest in the past  Are overweight or do not exercise  Had your first menstrual period before you were 11 years old  Went through menopause after age 57  Have never been pregnant or had your first child after age 35  Have had breast cancer before  Drink alcohol in any form  Have dense breasts  Are older in age  There is no certain way to prevent breast cancer. There are things you can do to lower your chances of having breast cancer.  Keep a healthy weight. Lose weight if you are overweight. Being overweight raises your chances of having breast cancer.  Eat a healthy diet to maintain a healthy weight, such as more fruits, vegetables, and lean cuts of meat. Decrease the amount of saturated fat in your diet.  Exercise. Being active helps you keep a healthy weight.  Limit your alcohol intake or do not drink alcohol. The more alcohol you drink, the higher your risk.  Do not smoke cigarettes. Smoking can increase your risk of many types of cancer.  Breastfeed your baby. This may help protect you. The longer you breastfeed, the more protection you have.  Talk with your doctor about:  Limiting or stopping hormone therapy.  Taking certain drugs to prevent  breast cancer. For women at high risk of having breast cancer, there are a few drugs that may lower your risk.  Surgery to prevent you from having breast cancer if you are very high risk.  When do I need to call the doctor?   Changes in your breasts  A lump or area in your breast that feels different  Discharge from your nipple  Skin on your breast is dimpled or indented  You have questions or concerns about your breasts  Helpful tips   Talk to your doctor about the best kind of breast cancer screening for you.  If you want to do self breast exams, have your doctor show you the right way to do them.  Tell your doctor of any abnormal finding.  Last Reviewed Date   2021-10-04  Consumer Information Use and Disclaimer   This generalized information is a limited summary of diagnosis, treatment, and/or medication information. It is not meant to be comprehensive and should be used as a tool to help the user understand and/or assess potential diagnostic and treatment options. It does NOT include all information about conditions, treatments, medications, side effects, or risks that may apply to a specific patient. It is not intended to be medical advice or a substitute for the medical advice, diagnosis, or treatment of a health care provider based on the health care provider's examination and assessment of a patient’s specific and unique circumstances. Patients must speak with a health care provider for complete information about their health, medical questions, and treatment options, including any risks or benefits regarding use of medications. This information does not endorse any treatments or medications as safe, effective, or approved for treating a specific patient. [employer] and its affiliates disclaim any warranty or liability relating to this information or the use thereof. The use of this information is governed by the Terms of Use, available at  https://www.[employer].com/en/know/clinical-effectiveness-terms   Copyright   Copyright © 2024 [employer] and its affiliates and/or licensors. All rights reserved.

## 2024-08-14 LAB — BACTERIA UR CULT: NORMAL

## 2025-01-08 ENCOUNTER — HOSPITAL ENCOUNTER (OUTPATIENT)
Dept: MAMMOGRAPHY | Facility: CLINIC | Age: 50
Discharge: HOME | End: 2025-01-08
Payer: COMMERCIAL

## 2025-01-08 VITALS — WEIGHT: 156 LBS | HEIGHT: 65 IN | BODY MASS INDEX: 25.99 KG/M2

## 2025-01-08 DIAGNOSIS — R92.8 ABNORMAL MAMMOGRAM: ICD-10-CM

## 2025-01-08 PROCEDURE — G0279 TOMOSYNTHESIS, MAMMO: HCPCS

## 2025-01-08 PROCEDURE — 77066 DX MAMMO INCL CAD BI: CPT

## 2025-01-09 ENCOUNTER — RESULTS FOLLOW-UP (OUTPATIENT)
Dept: FAMILY MEDICINE CLINIC | Facility: CLINIC | Age: 50
End: 2025-01-09

## 2025-01-16 ENCOUNTER — HOSPITAL ENCOUNTER (OUTPATIENT)
Dept: ULTRASOUND IMAGING | Facility: CLINIC | Age: 50
Discharge: HOME | End: 2025-01-16
Payer: COMMERCIAL

## 2025-01-16 DIAGNOSIS — N84.0 ENDOMETRIAL POLYP: ICD-10-CM

## 2025-01-16 PROCEDURE — 76830 TRANSVAGINAL US NON-OB: CPT

## 2025-01-16 PROCEDURE — 76856 US EXAM PELVIC COMPLETE: CPT

## 2025-01-21 ENCOUNTER — RESULTS FOLLOW-UP (OUTPATIENT)
Age: 50
End: 2025-01-21

## 2025-01-21 NOTE — RESULT ENCOUNTER NOTE
Please advise patient her ultrasound showed stable fibroids.  She does have a possible tiny polyp which has not really grown much in size.  She also has a right simple ovarian cyst which we do not need to worry about.  I would recommend repeating the ultrasound again in a year which we can discuss further at her next annual appointment which she should schedule for August 2025 .  She should continue to monitor her cycles and let us know if anything changes.  Thanks

## 2025-07-27 ENCOUNTER — HOSPITAL ENCOUNTER (EMERGENCY)
Facility: HOSPITAL | Age: 50
Discharge: HOME | End: 2025-07-27
Attending: EMERGENCY MEDICINE
Payer: COMMERCIAL

## 2025-07-27 VITALS
BODY MASS INDEX: 27.4 KG/M2 | SYSTOLIC BLOOD PRESSURE: 137 MMHG | OXYGEN SATURATION: 100 % | RESPIRATION RATE: 18 BRPM | WEIGHT: 164.46 LBS | DIASTOLIC BLOOD PRESSURE: 64 MMHG | HEART RATE: 65 BPM | TEMPERATURE: 97.7 F | HEIGHT: 65 IN

## 2025-07-27 DIAGNOSIS — L73.9 FOLLICULITIS: Primary | ICD-10-CM

## 2025-07-27 PROCEDURE — 99284 EMERGENCY DEPT VISIT MOD MDM: CPT | Performed by: EMERGENCY MEDICINE

## 2025-07-27 PROCEDURE — 99282 EMERGENCY DEPT VISIT SF MDM: CPT

## 2025-07-27 RX ORDER — SULFAMETHOXAZOLE AND TRIMETHOPRIM 800; 160 MG/1; MG/1
1 TABLET ORAL 2 TIMES DAILY
Qty: 14 TABLET | Refills: 0 | Status: SHIPPED | OUTPATIENT
Start: 2025-07-27 | End: 2025-08-03

## 2025-08-19 ENCOUNTER — ANNUAL EXAM (OUTPATIENT)
Age: 50
End: 2025-08-19
Payer: COMMERCIAL

## 2025-08-19 VITALS
BODY MASS INDEX: 27.16 KG/M2 | WEIGHT: 163 LBS | HEIGHT: 65 IN | DIASTOLIC BLOOD PRESSURE: 84 MMHG | SYSTOLIC BLOOD PRESSURE: 128 MMHG

## 2025-08-19 DIAGNOSIS — D25.9 UTERINE LEIOMYOMA, UNSPECIFIED LOCATION: ICD-10-CM

## 2025-08-19 DIAGNOSIS — Z12.31 SCREENING MAMMOGRAM FOR BREAST CANCER: ICD-10-CM

## 2025-08-19 DIAGNOSIS — N84.0 ENDOMETRIAL POLYP: ICD-10-CM

## 2025-08-19 DIAGNOSIS — Z01.419 ENCOUNTER FOR GYNECOLOGICAL EXAMINATION WITHOUT ABNORMAL FINDING: Primary | ICD-10-CM

## 2025-08-19 DIAGNOSIS — R92.30 DENSE BREASTS: ICD-10-CM

## 2025-08-19 PROBLEM — R07.1 CHEST PAIN ON BREATHING: Status: RESOLVED | Noted: 2024-03-25 | Resolved: 2025-08-19

## 2025-08-19 PROCEDURE — G0476 HPV COMBO ASSAY CA SCREEN: HCPCS | Performed by: NURSE PRACTITIONER

## 2025-08-19 PROCEDURE — G0145 SCR C/V CYTO,THINLAYER,RESCR: HCPCS | Performed by: NURSE PRACTITIONER

## 2025-08-19 PROCEDURE — S0612 ANNUAL GYNECOLOGICAL EXAMINA: HCPCS | Performed by: NURSE PRACTITIONER

## 2025-08-20 LAB
HPV HR 12 DNA CVX QL NAA+PROBE: NEGATIVE
HPV16 DNA CVX QL NAA+PROBE: NEGATIVE
HPV18 DNA CVX QL NAA+PROBE: NEGATIVE

## 2025-08-26 LAB
LAB AP GYN PRIMARY INTERPRETATION: NORMAL
Lab: NORMAL